# Patient Record
Sex: MALE | Race: WHITE | NOT HISPANIC OR LATINO | Employment: FULL TIME | URBAN - METROPOLITAN AREA
[De-identification: names, ages, dates, MRNs, and addresses within clinical notes are randomized per-mention and may not be internally consistent; named-entity substitution may affect disease eponyms.]

---

## 2023-02-16 PROBLEM — M54.50 LUMBAR PAIN: Status: ACTIVE | Noted: 2023-02-16

## 2023-02-16 PROBLEM — M47.812 CERVICAL ARTHRITIS: Status: ACTIVE | Noted: 2023-02-16

## 2023-02-16 PROBLEM — J32.9 SINUSITIS: Status: ACTIVE | Noted: 2023-02-16

## 2023-02-16 PROBLEM — J02.8 ACUTE BACTERIAL PHARYNGITIS: Status: ACTIVE | Noted: 2023-02-16

## 2023-02-16 PROBLEM — M79.89 FOOT SWELLING: Status: ACTIVE | Noted: 2023-02-16

## 2023-02-16 PROBLEM — G60.3 IDIOPATHIC PROGRESSIVE POLYNEUROPATHY: Status: ACTIVE | Noted: 2023-02-16

## 2023-02-16 PROBLEM — R07.9 CHEST PAIN: Status: ACTIVE | Noted: 2023-02-16

## 2023-02-16 PROBLEM — R07.81 RIB PAIN ON LEFT SIDE: Status: ACTIVE | Noted: 2023-02-16

## 2023-02-16 PROBLEM — M25.511 RIGHT SHOULDER PAIN: Status: ACTIVE | Noted: 2023-02-16

## 2023-02-16 PROBLEM — S46.819A TRAPEZIUS MUSCLE STRAIN: Status: ACTIVE | Noted: 2023-02-16

## 2023-02-16 PROBLEM — M72.2 PLANTAR FASCIITIS, RIGHT: Status: ACTIVE | Noted: 2023-02-16

## 2023-02-16 PROBLEM — M89.8X6 PAIN OF RIGHT TIBIA: Status: ACTIVE | Noted: 2023-02-16

## 2023-02-16 PROBLEM — R10.9 FLANK PAIN, ACUTE: Status: ACTIVE | Noted: 2023-02-16

## 2023-02-16 PROBLEM — Z86.69 HISTORY OF PERIPHERAL NEUROPATHY: Status: ACTIVE | Noted: 2023-02-16

## 2023-02-16 PROBLEM — K46.9 ABDOMINAL HERNIA: Status: ACTIVE | Noted: 2023-02-16

## 2023-02-16 PROBLEM — E66.9 OBESITY: Status: ACTIVE | Noted: 2023-02-16

## 2023-02-16 PROBLEM — J02.9 SORE THROAT: Status: ACTIVE | Noted: 2023-02-16

## 2023-02-16 PROBLEM — M10.9 ACUTE GOUT: Status: ACTIVE | Noted: 2023-02-16

## 2023-02-16 PROBLEM — M17.11 PRIMARY OSTEOARTHRITIS OF RIGHT KNEE: Status: ACTIVE | Noted: 2023-02-16

## 2023-02-16 PROBLEM — M79.671 RIGHT FOOT PAIN: Status: ACTIVE | Noted: 2023-02-16

## 2023-02-16 PROBLEM — J20.9 ACUTE BRONCHITIS: Status: ACTIVE | Noted: 2023-02-16

## 2023-02-16 PROBLEM — R68.89 HEAT INTOLERANCE: Status: ACTIVE | Noted: 2023-02-16

## 2023-02-16 PROBLEM — K57.30 SIGMOID DIVERTICULOSIS: Status: ACTIVE | Noted: 2023-02-16

## 2023-02-16 PROBLEM — R74.8 ABNORMAL LIVER ENZYMES: Status: ACTIVE | Noted: 2023-02-16

## 2023-02-16 PROBLEM — D22.9 ATYPICAL MOLE: Status: ACTIVE | Noted: 2023-02-16

## 2023-02-16 PROBLEM — B96.89 ACUTE BACTERIAL PHARYNGITIS: Status: ACTIVE | Noted: 2023-02-16

## 2023-02-16 PROBLEM — K21.9 GASTROESOPHAGEAL REFLUX DISEASE: Status: ACTIVE | Noted: 2023-02-16

## 2023-02-16 PROBLEM — R94.31 ABNORMAL EKG: Status: ACTIVE | Noted: 2023-02-16

## 2023-02-16 PROBLEM — N45.1 EPIDIDYMITIS: Status: ACTIVE | Noted: 2023-02-16

## 2023-02-16 PROBLEM — E11.9 TYPE 2 DIABETES MELLITUS (MULTI): Status: ACTIVE | Noted: 2023-02-16

## 2023-02-16 PROBLEM — I10 HYPERTENSION: Status: ACTIVE | Noted: 2023-02-16

## 2023-02-16 PROBLEM — L03.032 CELLULITIS OF SECOND TOE OF LEFT FOOT: Status: ACTIVE | Noted: 2023-02-16

## 2023-02-16 RX ORDER — BLOOD SUGAR DIAGNOSTIC
STRIP MISCELLANEOUS
COMMUNITY
Start: 2020-08-10

## 2023-02-16 RX ORDER — LISINOPRIL 20 MG/1
1 TABLET ORAL DAILY
COMMUNITY
Start: 2015-01-14 | End: 2023-06-26

## 2023-02-16 RX ORDER — LANCETS
EACH MISCELLANEOUS
COMMUNITY

## 2023-02-16 RX ORDER — ATORVASTATIN CALCIUM 20 MG/1
1 TABLET, FILM COATED ORAL DAILY
COMMUNITY
Start: 2021-06-23 | End: 2023-08-15

## 2023-02-16 RX ORDER — METFORMIN HYDROCHLORIDE 500 MG/1
1 TABLET ORAL
COMMUNITY
Start: 2020-05-18 | End: 2023-03-30

## 2023-02-16 RX ORDER — ASPIRIN 81 MG/1
1 TABLET ORAL DAILY
COMMUNITY
Start: 2020-05-18

## 2023-03-30 ENCOUNTER — OFFICE VISIT (OUTPATIENT)
Dept: PRIMARY CARE | Facility: CLINIC | Age: 63
End: 2023-03-30
Payer: COMMERCIAL

## 2023-03-30 VITALS
SYSTOLIC BLOOD PRESSURE: 141 MMHG | BODY MASS INDEX: 36.02 KG/M2 | HEIGHT: 76 IN | DIASTOLIC BLOOD PRESSURE: 81 MMHG | HEART RATE: 74 BPM | RESPIRATION RATE: 18 BRPM | WEIGHT: 295.8 LBS

## 2023-03-30 DIAGNOSIS — E11.9 TYPE 2 DIABETES MELLITUS WITHOUT COMPLICATION, WITHOUT LONG-TERM CURRENT USE OF INSULIN (MULTI): Primary | ICD-10-CM

## 2023-03-30 DIAGNOSIS — Z12.5 SCREENING FOR PROSTATE CANCER: ICD-10-CM

## 2023-03-30 DIAGNOSIS — K76.0 NONALCOHOLIC HEPATOSTEATOSIS: ICD-10-CM

## 2023-03-30 DIAGNOSIS — R74.8 ELEVATED LIVER ENZYMES: ICD-10-CM

## 2023-03-30 DIAGNOSIS — Z23 ENCOUNTER FOR IMMUNIZATION: ICD-10-CM

## 2023-03-30 PROCEDURE — 1036F TOBACCO NON-USER: CPT | Performed by: NURSE PRACTITIONER

## 2023-03-30 PROCEDURE — 90750 HZV VACC RECOMBINANT IM: CPT | Performed by: NURSE PRACTITIONER

## 2023-03-30 PROCEDURE — 4010F ACE/ARB THERAPY RXD/TAKEN: CPT | Performed by: NURSE PRACTITIONER

## 2023-03-30 PROCEDURE — 3079F DIAST BP 80-89 MM HG: CPT | Performed by: NURSE PRACTITIONER

## 2023-03-30 PROCEDURE — 3077F SYST BP >= 140 MM HG: CPT | Performed by: NURSE PRACTITIONER

## 2023-03-30 PROCEDURE — 99214 OFFICE O/P EST MOD 30 MIN: CPT | Performed by: NURSE PRACTITIONER

## 2023-03-30 PROCEDURE — 90471 IMMUNIZATION ADMIN: CPT | Performed by: NURSE PRACTITIONER

## 2023-03-30 RX ORDER — METFORMIN HYDROCHLORIDE 500 MG/1
TABLET ORAL
Qty: 180 TABLET | Refills: 1 | Status: SHIPPED | OUTPATIENT
Start: 2023-03-30 | End: 2023-10-05

## 2023-03-30 ASSESSMENT — ENCOUNTER SYMPTOMS
HEADACHES: 0
ABDOMINAL DISTENTION: 0
BRUISES/BLEEDS EASILY: 0
CHEST TIGHTNESS: 0
GASTROINTESTINAL NEGATIVE: 1
FATIGUE: 0
DIZZINESS: 0
CONSTIPATION: 0
PALPITATIONS: 0
FEVER: 0
APPETITE CHANGE: 0
EYES NEGATIVE: 1
POLYDIPSIA: 0
SHORTNESS OF BREATH: 0
ACTIVITY CHANGE: 0
RESPIRATORY NEGATIVE: 1
ABDOMINAL PAIN: 0
POLYPHAGIA: 0
ENDOCRINE NEGATIVE: 1
WEAKNESS: 0
VOMITING: 0
BLOOD IN STOOL: 0
CHILLS: 0
CARDIOVASCULAR NEGATIVE: 1
DIAPHORESIS: 0
ADENOPATHY: 0
NAUSEA: 0
DIARRHEA: 0
WHEEZING: 0

## 2023-03-30 NOTE — PROGRESS NOTES
"Subjective   Patient ID: Eric Tinajero is a 63 y.o. male who presents for Follow-up (Blood pressure, second shingrix shot).    HPI   Patient in office for follow-up on hypertension, hyperlipidemia, and diabetes. He needs a blood pressure check, A1C recheck (would like to be added to regular fasting blood work), and medication refill. Imaging at recent ER visit for abdominal pain showed Steatosis of liver (the patient has no symptoms); will refer to gastroenterology for a consult. The patient would like to receive his second Shingrix shot today. No other concerns today.    BP: controlled    Sees ophthalmologist regularly.     Monitors his feet for lesions.    Review of Systems   Constitutional:  Negative for activity change, appetite change, chills, diaphoresis, fatigue and fever.   HENT: Negative.     Eyes: Negative.    Respiratory: Negative.  Negative for chest tightness, shortness of breath and wheezing.    Cardiovascular: Negative.  Negative for chest pain, palpitations and leg swelling.   Gastrointestinal: Negative.  Negative for abdominal distention, abdominal pain, blood in stool, constipation, diarrhea, nausea and vomiting.   Endocrine: Negative.  Negative for cold intolerance, heat intolerance, polydipsia, polyphagia and polyuria.   Genitourinary: Negative.    Neurological:  Negative for dizziness, syncope, weakness and headaches.   Hematological:  Negative for adenopathy. Does not bruise/bleed easily.       Objective   /81   Pulse 74   Resp 18   Ht 1.93 m (6' 4\")   Wt 134 kg (295 lb 12.8 oz)   BMI 36.01 kg/m²     Physical Exam  Constitutional:       Appearance: Normal appearance.   HENT:      Head: Normocephalic.   Eyes:      Conjunctiva/sclera: Conjunctivae normal.   Cardiovascular:      Rate and Rhythm: Normal rate and regular rhythm.      Pulses: Normal pulses.      Heart sounds: Normal heart sounds.   Pulmonary:      Effort: Pulmonary effort is normal.      Breath sounds: Normal breath " sounds.   Neurological:      General: No focal deficit present.      Mental Status: He is alert.      Gait: Gait normal.       Assessment/Plan     Exam findings reviewed with patient. Medications, referral, and and lab work orders reviewed. Patient will keep monitoring blood pressures and blood sugars at home; he will call the office with outliers or abnormal trends. We will call with lab results and update the patient on the plan of care. Follow up in 6 months for A1C recheck, fasting lipids, physical, or earlier as needed.      Benefits of healthy lifestyle reviewed: low carbohydrates/fat/sugar diet; use lean white instead of red meet; use several servings of fruit and vegetables daily; use whole grain flour instead of white flour products; cook at home frequently instead of eating out; exercise 30-90 minutes 5 x/week.

## 2023-03-31 ENCOUNTER — LAB (OUTPATIENT)
Dept: LAB | Facility: LAB | Age: 63
End: 2023-03-31
Payer: COMMERCIAL

## 2023-03-31 DIAGNOSIS — E11.9 TYPE 2 DIABETES MELLITUS WITHOUT COMPLICATION, WITHOUT LONG-TERM CURRENT USE OF INSULIN (MULTI): ICD-10-CM

## 2023-03-31 DIAGNOSIS — Z12.5 SCREENING FOR PROSTATE CANCER: ICD-10-CM

## 2023-03-31 LAB
ALANINE AMINOTRANSFERASE (SGPT) (U/L) IN SER/PLAS: 69 U/L (ref 10–52)
ALBUMIN (G/DL) IN SER/PLAS: 4.6 G/DL (ref 3.4–5)
ALKALINE PHOSPHATASE (U/L) IN SER/PLAS: 50 U/L (ref 33–136)
ANION GAP IN SER/PLAS: 15 MMOL/L (ref 10–20)
ASPARTATE AMINOTRANSFERASE (SGOT) (U/L) IN SER/PLAS: 45 U/L (ref 9–39)
BASOPHILS (10*3/UL) IN BLOOD BY AUTOMATED COUNT: 0.08 X10E9/L (ref 0–0.1)
BASOPHILS/100 LEUKOCYTES IN BLOOD BY AUTOMATED COUNT: 0.8 % (ref 0–2)
BILIRUBIN TOTAL (MG/DL) IN SER/PLAS: 1.6 MG/DL (ref 0–1.2)
CALCIUM (MG/DL) IN SER/PLAS: 9.5 MG/DL (ref 8.6–10.6)
CARBON DIOXIDE, TOTAL (MMOL/L) IN SER/PLAS: 27 MMOL/L (ref 21–32)
CHLORIDE (MMOL/L) IN SER/PLAS: 101 MMOL/L (ref 98–107)
CREATININE (MG/DL) IN SER/PLAS: 0.82 MG/DL (ref 0.5–1.3)
EOSINOPHILS (10*3/UL) IN BLOOD BY AUTOMATED COUNT: 0.19 X10E9/L (ref 0–0.7)
EOSINOPHILS/100 LEUKOCYTES IN BLOOD BY AUTOMATED COUNT: 1.8 % (ref 0–6)
ERYTHROCYTE DISTRIBUTION WIDTH (RATIO) BY AUTOMATED COUNT: 12.3 % (ref 11.5–14.5)
ERYTHROCYTE MEAN CORPUSCULAR HEMOGLOBIN CONCENTRATION (G/DL) BY AUTOMATED: 34.2 G/DL (ref 32–36)
ERYTHROCYTE MEAN CORPUSCULAR VOLUME (FL) BY AUTOMATED COUNT: 89 FL (ref 80–100)
ERYTHROCYTES (10*6/UL) IN BLOOD BY AUTOMATED COUNT: 5.77 X10E12/L (ref 4.5–5.9)
ESTIMATED AVERAGE GLUCOSE FOR HBA1C: 137 MG/DL
GFR MALE: >90 ML/MIN/1.73M2
GLUCOSE (MG/DL) IN SER/PLAS: 143 MG/DL (ref 74–99)
HEMATOCRIT (%) IN BLOOD BY AUTOMATED COUNT: 51.2 % (ref 41–52)
HEMOGLOBIN (G/DL) IN BLOOD: 17.5 G/DL (ref 13.5–17.5)
HEMOGLOBIN A1C/HEMOGLOBIN TOTAL IN BLOOD: 6.4 %
IMMATURE GRANULOCYTES/100 LEUKOCYTES IN BLOOD BY AUTOMATED COUNT: 0.5 % (ref 0–0.9)
LEUKOCYTES (10*3/UL) IN BLOOD BY AUTOMATED COUNT: 10.3 X10E9/L (ref 4.4–11.3)
LYMPHOCYTES (10*3/UL) IN BLOOD BY AUTOMATED COUNT: 1.82 X10E9/L (ref 1.2–4.8)
LYMPHOCYTES/100 LEUKOCYTES IN BLOOD BY AUTOMATED COUNT: 17.7 % (ref 13–44)
MONOCYTES (10*3/UL) IN BLOOD BY AUTOMATED COUNT: 1.83 X10E9/L (ref 0.1–1)
MONOCYTES/100 LEUKOCYTES IN BLOOD BY AUTOMATED COUNT: 17.7 % (ref 2–10)
NEUTROPHILS (10*3/UL) IN BLOOD BY AUTOMATED COUNT: 6.34 X10E9/L (ref 1.2–7.7)
NEUTROPHILS/100 LEUKOCYTES IN BLOOD BY AUTOMATED COUNT: 61.5 % (ref 40–80)
NRBC (PER 100 WBCS) BY AUTOMATED COUNT: 0 /100 WBC (ref 0–0)
PLATELETS (10*3/UL) IN BLOOD AUTOMATED COUNT: 191 X10E9/L (ref 150–450)
POTASSIUM (MMOL/L) IN SER/PLAS: 4.5 MMOL/L (ref 3.5–5.3)
PROSTATE SPECIFIC ANTIGEN,SCREEN: 0.66 NG/ML (ref 0–4)
PROTEIN TOTAL: 7 G/DL (ref 6.4–8.2)
SODIUM (MMOL/L) IN SER/PLAS: 138 MMOL/L (ref 136–145)
THYROTROPIN (MIU/L) IN SER/PLAS BY DETECTION LIMIT <= 0.05 MIU/L: 1.96 MIU/L (ref 0.44–3.98)
UREA NITROGEN (MG/DL) IN SER/PLAS: 13 MG/DL (ref 6–23)

## 2023-03-31 PROCEDURE — 83036 HEMOGLOBIN GLYCOSYLATED A1C: CPT

## 2023-03-31 PROCEDURE — 36415 COLL VENOUS BLD VENIPUNCTURE: CPT

## 2023-03-31 PROCEDURE — 80053 COMPREHEN METABOLIC PANEL: CPT

## 2023-03-31 PROCEDURE — 84153 ASSAY OF PSA TOTAL: CPT

## 2023-03-31 PROCEDURE — 85025 COMPLETE CBC W/AUTO DIFF WBC: CPT

## 2023-03-31 PROCEDURE — 84443 ASSAY THYROID STIM HORMONE: CPT

## 2023-03-31 NOTE — RESULT ENCOUNTER NOTE
Please call the patient. (1) A1C is stable; 6.4%; advocate healthy diet and exercise; recheck in 6 months. (2) Normal PSA; recheck in 1 year; (3) slightly elevated liver enzymes; watch fat and alcohol intake; stay well-hydrated; advise patient to review enzyme levels with gastroenterologist; per referral; (4) elevated monocytes (non-specific marker for inflammation, infection, or auto-immune process); advise the patient to review monocyte elevation with gastroenterologist; if there is no correlation with gastrointestinal problem/s, consider referral to hematology. Follow up in 1 month for reevaluation or earlier as needed. TY

## 2023-07-24 ENCOUNTER — APPOINTMENT (OUTPATIENT)
Dept: LAB | Facility: LAB | Age: 63
End: 2023-07-24
Payer: COMMERCIAL

## 2023-07-24 LAB
ALANINE AMINOTRANSFERASE (SGPT) (U/L) IN SER/PLAS: 88 U/L (ref 10–52)
ALBUMIN (G/DL) IN SER/PLAS: 4.8 G/DL (ref 3.4–5)
ALKALINE PHOSPHATASE (U/L) IN SER/PLAS: 45 U/L (ref 33–136)
ALPHA 1 ANTITRYPSIN (MG/DL) IN SER/PLAS: 144 MG/DL (ref 84–218)
ALPHA-1 FETOPROTEIN (NG/ML) IN SER/PLAS: 5 NG/ML (ref 0–9)
ASPARTATE AMINOTRANSFERASE (SGOT) (U/L) IN SER/PLAS: 45 U/L (ref 9–39)
BILIRUBIN DIRECT (MG/DL) IN SER/PLAS: 0.2 MG/DL (ref 0–0.3)
BILIRUBIN TOTAL (MG/DL) IN SER/PLAS: 0.8 MG/DL (ref 0–1.2)
CERULOPLASMIN (MG/DL) IN SER/PLAS: 22 MG/DL (ref 20–60)
FERRITIN (UG/LL) IN SER/PLAS: 626 UG/L (ref 20–300)
HEPATITIS A TOTAL AB INTERPRETATION: REACTIVE
HEPATITIS B VIRUS CORE AB (PRESENCE) IN SER/PLAS BY IMM: NONREACTIVE
HEPATITIS B VIRUS SURFACE AB (MIU/ML) IN SERUM: 10.3 MIU/ML
HEPATITIS B VIRUS SURFACE AG PRESENCE IN SERUM: NONREACTIVE
HEPATITIS C VIRUS AB PRESENCE IN SERUM: NONREACTIVE
INR IN PPP BY COAGULATION ASSAY: 1.1 (ref 0.9–1.1)
IRON (UG/DL) IN SER/PLAS: 140 UG/DL (ref 35–150)
IRON BINDING CAPACITY (UG/DL) IN SER/PLAS: 371 UG/DL (ref 240–445)
IRON SATURATION (%) IN SER/PLAS: 38 % (ref 25–45)
PROTEIN TOTAL: 7.1 G/DL (ref 6.4–8.2)
PROTHROMBIN TIME (PT) IN PPP BY COAGULATION ASSAY: 12.4 SEC (ref 9.8–12.8)

## 2023-07-25 LAB
ANTI-NUCLEAR ANTIBODY (ANA): NEGATIVE
ANTI-SMOOTH MUSCLE ANTIBODY: NEGATIVE
MITOCHONDRIAL ANTIBODY: NEGATIVE

## 2023-07-28 LAB
EER PETH: NORMAL
PETH 16:0/18:1 (POPETH): <10 NG/ML
PETH 16:0/18:2 (PLPETH): <10 NG/ML

## 2023-10-05 DIAGNOSIS — E11.9 TYPE 2 DIABETES MELLITUS WITHOUT COMPLICATION, WITHOUT LONG-TERM CURRENT USE OF INSULIN (MULTI): ICD-10-CM

## 2023-10-05 RX ORDER — METFORMIN HYDROCHLORIDE 500 MG/1
500 TABLET ORAL
Qty: 60 TABLET | Refills: 0 | Status: SHIPPED | OUTPATIENT
Start: 2023-10-05 | End: 2023-11-03

## 2023-11-03 DIAGNOSIS — E11.9 TYPE 2 DIABETES MELLITUS WITHOUT COMPLICATION, WITHOUT LONG-TERM CURRENT USE OF INSULIN (MULTI): ICD-10-CM

## 2023-11-03 RX ORDER — METFORMIN HYDROCHLORIDE 500 MG/1
500 TABLET ORAL
Qty: 180 TABLET | Refills: 1 | Status: SHIPPED | OUTPATIENT
Start: 2023-11-03 | End: 2024-05-13

## 2024-01-10 ASSESSMENT — ENCOUNTER SYMPTOMS
BLURRED VISION: 0
SWEATS: 0
HYPERTENSION: 1
SHORTNESS OF BREATH: 0
ORTHOPNEA: 0
PALPITATIONS: 0
NECK PAIN: 0
HEADACHES: 0
PND: 0

## 2024-01-11 ENCOUNTER — OFFICE VISIT (OUTPATIENT)
Dept: PRIMARY CARE | Facility: CLINIC | Age: 64
End: 2024-01-11
Payer: COMMERCIAL

## 2024-01-11 VITALS
BODY MASS INDEX: 35.18 KG/M2 | DIASTOLIC BLOOD PRESSURE: 82 MMHG | SYSTOLIC BLOOD PRESSURE: 138 MMHG | WEIGHT: 289 LBS | HEART RATE: 71 BPM | OXYGEN SATURATION: 96 % | TEMPERATURE: 97.6 F | RESPIRATION RATE: 18 BRPM

## 2024-01-11 DIAGNOSIS — E11.9 TYPE 2 DIABETES MELLITUS WITHOUT COMPLICATION, WITHOUT LONG-TERM CURRENT USE OF INSULIN (MULTI): ICD-10-CM

## 2024-01-11 DIAGNOSIS — Z91.89 OTHER SPECIFIED PERSONAL RISK FACTORS, NOT ELSEWHERE CLASSIFIED: ICD-10-CM

## 2024-01-11 DIAGNOSIS — I10 ESSENTIAL (PRIMARY) HYPERTENSION: Primary | ICD-10-CM

## 2024-01-11 PROCEDURE — 80061 LIPID PANEL: CPT

## 2024-01-11 PROCEDURE — 80053 COMPREHEN METABOLIC PANEL: CPT

## 2024-01-11 PROCEDURE — 1036F TOBACCO NON-USER: CPT | Performed by: NURSE PRACTITIONER

## 2024-01-11 PROCEDURE — 85025 COMPLETE CBC W/AUTO DIFF WBC: CPT

## 2024-01-11 PROCEDURE — 4010F ACE/ARB THERAPY RXD/TAKEN: CPT | Performed by: NURSE PRACTITIONER

## 2024-01-11 PROCEDURE — 3079F DIAST BP 80-89 MM HG: CPT | Performed by: NURSE PRACTITIONER

## 2024-01-11 PROCEDURE — 3075F SYST BP GE 130 - 139MM HG: CPT | Performed by: NURSE PRACTITIONER

## 2024-01-11 PROCEDURE — 99213 OFFICE O/P EST LOW 20 MIN: CPT | Performed by: NURSE PRACTITIONER

## 2024-01-11 PROCEDURE — 83036 HEMOGLOBIN GLYCOSYLATED A1C: CPT

## 2024-01-11 PROCEDURE — 36415 COLL VENOUS BLD VENIPUNCTURE: CPT

## 2024-01-11 RX ORDER — LISINOPRIL 20 MG/1
20 TABLET ORAL DAILY
Qty: 90 TABLET | Refills: 0 | Status: CANCELLED | OUTPATIENT
Start: 2024-01-11

## 2024-01-11 RX ORDER — LISINOPRIL 20 MG/1
20 TABLET ORAL DAILY
Qty: 90 TABLET | Refills: 1 | Status: SHIPPED | OUTPATIENT
Start: 2024-01-11 | End: 2024-05-31 | Stop reason: SDUPTHER

## 2024-01-11 RX ORDER — ATORVASTATIN CALCIUM 20 MG/1
20 TABLET, FILM COATED ORAL DAILY
Qty: 90 TABLET | Refills: 1 | Status: SHIPPED | OUTPATIENT
Start: 2024-01-11 | End: 2024-05-31 | Stop reason: SDUPTHER

## 2024-01-11 RX ORDER — PYRIDOXINE HCL (VITAMIN B6) 100 MG
TABLET ORAL
COMMUNITY
Start: 2023-07-17

## 2024-01-11 RX ORDER — CALCIUM CARBONATE 300MG(750)
TABLET,CHEWABLE ORAL
COMMUNITY
Start: 2023-07-17

## 2024-01-11 RX ORDER — GLUCOSAMINE/CHONDROITIN/C/MANG 500-400 MG
CAPSULE ORAL
COMMUNITY

## 2024-01-11 ASSESSMENT — ENCOUNTER SYMPTOMS
FATIGUE: 0
ADENOPATHY: 0
ACTIVITY CHANGE: 0
BLOOD IN STOOL: 0
ABDOMINAL PAIN: 0
DIARRHEA: 0
PALPITATIONS: 0
BRUISES/BLEEDS EASILY: 0
SHORTNESS OF BREATH: 0
DIAPHORESIS: 0
WHEEZING: 0
DIZZINESS: 0
ABDOMINAL DISTENTION: 0
ENDOCRINE NEGATIVE: 1
HEADACHES: 0
FEVER: 0
GASTROINTESTINAL NEGATIVE: 1
POLYPHAGIA: 0
VOMITING: 0
CONSTIPATION: 0
POLYDIPSIA: 0
APPETITE CHANGE: 0
CARDIOVASCULAR NEGATIVE: 1
NAUSEA: 0
RESPIRATORY NEGATIVE: 1
WEAKNESS: 0
CHILLS: 0
CHEST TIGHTNESS: 0
EYES NEGATIVE: 1

## 2024-01-11 NOTE — PROGRESS NOTES
Subjective   Patient ID: Eric Tinajero is a 63 y.o. male who presents for Med Refill.    HPI   Patient in office for follow-up on hypertension, hyperlipidemia, and diabetes. He needs a blood pressure check, A1C recheck, and medication refill. He was seen by gastroenterology for elevated liver enzymes but released from their care with advise to watch his diet closely. No other concerns today.    BP: controlled    Sees ophthalmologist regularly.     Monitors his feet for lesions.    Review of Systems   Constitutional:  Negative for activity change, appetite change, chills, diaphoresis, fatigue and fever.   HENT: Negative.     Eyes: Negative.    Respiratory: Negative.  Negative for chest tightness, shortness of breath and wheezing.    Cardiovascular: Negative.  Negative for chest pain, palpitations and leg swelling.   Gastrointestinal: Negative.  Negative for abdominal distention, abdominal pain, blood in stool, constipation, diarrhea, nausea and vomiting.   Endocrine: Negative.  Negative for cold intolerance, heat intolerance, polydipsia, polyphagia and polyuria.   Genitourinary: Negative.    Neurological:  Negative for dizziness, syncope, weakness and headaches.   Hematological:  Negative for adenopathy. Does not bruise/bleed easily.       Objective   /82   Pulse 71   Temp 36.4 °C (97.6 °F)   Resp 18   Wt 131 kg (289 lb)   SpO2 96%   BMI 35.18 kg/m²     Physical Exam  Constitutional:       Appearance: Normal appearance.   HENT:      Head: Normocephalic.   Eyes:      Conjunctiva/sclera: Conjunctivae normal.   Cardiovascular:      Rate and Rhythm: Normal rate and regular rhythm.      Pulses: Normal pulses.      Heart sounds: Normal heart sounds.   Pulmonary:      Effort: Pulmonary effort is normal.      Breath sounds: Normal breath sounds.   Neurological:      General: No focal deficit present.      Mental Status: He is alert.      Gait: Gait normal.       Assessment/Plan     Exam findings reviewed with  patient. Medications, referral, and and lab work orders reviewed. Patient will keep monitoring blood pressures and blood sugars at home; he will call the office with outliers or abnormal trends. We will call with lab results and update the patient on the plan of care. Follow up in 6 months for A1C recheck, fasting lipids, physical, or earlier as needed.      Benefits of healthy lifestyle reviewed: low carbohydrates/fat/sugar diet; use lean white instead of red meet; use several servings of fruit and vegetables daily; use whole grain flour instead of white flour products; cook at home frequently instead of eating out; exercise 30-90 minutes 5 x/week.

## 2024-01-11 NOTE — PROGRESS NOTES
Answers submitted by the patient for this visit:  High Blood Pressure Questionnaire (Submitted on 1/10/2024)  Chief Complaint: Hypertension  Chronicity: chronic  Onset: more than 1 year ago  Progression since onset: waxing and waning  Condition status: controlled  anxiety: No  blurred vision: No  chest pain: No  headaches: No  malaise/fatigue: No  neck pain: No  orthopnea: No  palpitations: No  peripheral edema: No  PND: No  shortness of breath: No  sweats: No  Agents associated with hypertension: no associated agents  CAD risks: diabetes mellitus, family history, obesity, stress  Compliance problems: exercise

## 2024-01-12 DIAGNOSIS — D72.821 MONOCYTOSIS: Primary | ICD-10-CM

## 2024-01-12 LAB
ALBUMIN SERPL BCP-MCNC: 4.5 G/DL (ref 3.4–5)
ALP SERPL-CCNC: 39 U/L (ref 33–136)
ALT SERPL W P-5'-P-CCNC: 71 U/L (ref 10–52)
ANION GAP SERPL CALC-SCNC: 15 MMOL/L (ref 10–20)
AST SERPL W P-5'-P-CCNC: 49 U/L (ref 9–39)
BASOPHILS # BLD AUTO: 0.07 X10*3/UL (ref 0–0.1)
BASOPHILS NFR BLD AUTO: 1 %
BILIRUB SERPL-MCNC: 0.7 MG/DL (ref 0–1.2)
BUN SERPL-MCNC: 13 MG/DL (ref 6–23)
CALCIUM SERPL-MCNC: 9.8 MG/DL (ref 8.6–10.6)
CHLORIDE SERPL-SCNC: 103 MMOL/L (ref 98–107)
CHOLEST SERPL-MCNC: 92 MG/DL (ref 0–199)
CHOLESTEROL/HDL RATIO: 2.2
CO2 SERPL-SCNC: 28 MMOL/L (ref 21–32)
CREAT SERPL-MCNC: 0.92 MG/DL (ref 0.5–1.3)
EGFRCR SERPLBLD CKD-EPI 2021: >90 ML/MIN/1.73M*2
EOSINOPHIL # BLD AUTO: 0.16 X10*3/UL (ref 0–0.7)
EOSINOPHIL NFR BLD AUTO: 2.3 %
ERYTHROCYTE [DISTWIDTH] IN BLOOD BY AUTOMATED COUNT: 12.4 % (ref 11.5–14.5)
EST. AVERAGE GLUCOSE BLD GHB EST-MCNC: 148 MG/DL
GLUCOSE SERPL-MCNC: 157 MG/DL (ref 74–99)
HBA1C MFR BLD: 6.8 %
HCT VFR BLD AUTO: 49.8 % (ref 41–52)
HDLC SERPL-MCNC: 41.1 MG/DL
HGB BLD-MCNC: 16.5 G/DL (ref 13.5–17.5)
IMM GRANULOCYTES # BLD AUTO: 0.02 X10*3/UL (ref 0–0.7)
IMM GRANULOCYTES NFR BLD AUTO: 0.3 % (ref 0–0.9)
LDLC SERPL CALC-MCNC: 32 MG/DL
LYMPHOCYTES # BLD AUTO: 1.92 X10*3/UL (ref 1.2–4.8)
LYMPHOCYTES NFR BLD AUTO: 27.5 %
MCH RBC QN AUTO: 30.6 PG (ref 26–34)
MCHC RBC AUTO-ENTMCNC: 33.1 G/DL (ref 32–36)
MCV RBC AUTO: 92 FL (ref 80–100)
MONOCYTES # BLD AUTO: 1.3 X10*3/UL (ref 0.1–1)
MONOCYTES NFR BLD AUTO: 18.6 %
NEUTROPHILS # BLD AUTO: 3.52 X10*3/UL (ref 1.2–7.7)
NEUTROPHILS NFR BLD AUTO: 50.3 %
NON HDL CHOLESTEROL: 51 MG/DL (ref 0–149)
NRBC BLD-RTO: 0 /100 WBCS (ref 0–0)
PLATELET # BLD AUTO: 183 X10*3/UL (ref 150–450)
POTASSIUM SERPL-SCNC: 4.7 MMOL/L (ref 3.5–5.3)
PROT SERPL-MCNC: 7.1 G/DL (ref 6.4–8.2)
RBC # BLD AUTO: 5.4 X10*6/UL (ref 4.5–5.9)
SODIUM SERPL-SCNC: 141 MMOL/L (ref 136–145)
TRIGL SERPL-MCNC: 95 MG/DL (ref 0–149)
VLDL: 19 MG/DL (ref 0–40)
WBC # BLD AUTO: 7 X10*3/UL (ref 4.4–11.3)

## 2024-01-12 NOTE — RESULT ENCOUNTER NOTE
Please call the patient. Overall stable labs. A1C is 6.8%. Cholesterol is normal. Liver enzymes remain slightly elevated but stable. [facility] diet and exercise; limit fat, sugar, and alcohol intake. Monocytes (a white blood cell type) remains slightly elevated. Will refer to hematology for a consult. Recheck labs in 6 months. TY

## 2024-04-15 DIAGNOSIS — D72.821 MONOCYTOSIS: Primary | ICD-10-CM

## 2024-04-17 ENCOUNTER — LAB (OUTPATIENT)
Dept: LAB | Facility: LAB | Age: 64
End: 2024-04-17
Payer: COMMERCIAL

## 2024-04-17 DIAGNOSIS — D72.821 MONOCYTOSIS: ICD-10-CM

## 2024-04-17 LAB
ALBUMIN SERPL BCP-MCNC: 4.4 G/DL (ref 3.4–5)
ALP SERPL-CCNC: 45 U/L (ref 33–136)
ALT SERPL W P-5'-P-CCNC: 72 U/L (ref 10–52)
ANION GAP SERPL CALC-SCNC: 14 MMOL/L (ref 10–20)
AST SERPL W P-5'-P-CCNC: 37 U/L (ref 9–39)
BASOPHILS # BLD AUTO: 0.06 X10*3/UL (ref 0–0.1)
BASOPHILS NFR BLD AUTO: 0.8 %
BILIRUB SERPL-MCNC: 0.7 MG/DL (ref 0–1.2)
BUN SERPL-MCNC: 14 MG/DL (ref 6–23)
CALCIUM SERPL-MCNC: 9.7 MG/DL (ref 8.6–10.6)
CHLORIDE SERPL-SCNC: 102 MMOL/L (ref 98–107)
CO2 SERPL-SCNC: 29 MMOL/L (ref 21–32)
CREAT SERPL-MCNC: 0.89 MG/DL (ref 0.5–1.3)
CRP SERPL-MCNC: <0.1 MG/DL
EBV VCA IGG SER IA-ACNC: NEGATIVE
EBV VCA IGM SER IA-ACNC: NEGATIVE
EGFRCR SERPLBLD CKD-EPI 2021: >90 ML/MIN/1.73M*2
EOSINOPHIL # BLD AUTO: 0.14 X10*3/UL (ref 0–0.7)
EOSINOPHIL NFR BLD AUTO: 1.8 %
ERYTHROCYTE [DISTWIDTH] IN BLOOD BY AUTOMATED COUNT: 12.1 % (ref 11.5–14.5)
ERYTHROCYTE [SEDIMENTATION RATE] IN BLOOD BY WESTERGREN METHOD: 6 MM/H (ref 0–20)
GLUCOSE SERPL-MCNC: 125 MG/DL (ref 74–99)
HBV CORE IGM SER QL: NONREACTIVE
HBV SURFACE AG SERPL QL IA: NONREACTIVE
HCT VFR BLD AUTO: 49.7 % (ref 41–52)
HCV AB SER QL: NONREACTIVE
HGB BLD-MCNC: 16.9 G/DL (ref 13.5–17.5)
HIV 1+2 AB+HIV1 P24 AG SERPL QL IA: NONREACTIVE
IGA SERPL-MCNC: 169 MG/DL (ref 70–400)
IGG SERPL-MCNC: 952 MG/DL (ref 700–1600)
IGM SERPL-MCNC: 91 MG/DL (ref 40–230)
IMM GRANULOCYTES # BLD AUTO: 0.05 X10*3/UL (ref 0–0.7)
IMM GRANULOCYTES NFR BLD AUTO: 0.7 % (ref 0–0.9)
LYMPHOCYTES # BLD AUTO: 2.13 X10*3/UL (ref 1.2–4.8)
LYMPHOCYTES NFR BLD AUTO: 28.1 %
MCH RBC QN AUTO: 31.1 PG (ref 26–34)
MCHC RBC AUTO-ENTMCNC: 34 G/DL (ref 32–36)
MCV RBC AUTO: 91 FL (ref 80–100)
MONOCYTES # BLD AUTO: 1.73 X10*3/UL (ref 0.1–1)
MONOCYTES NFR BLD AUTO: 22.9 %
NEUTROPHILS # BLD AUTO: 3.46 X10*3/UL (ref 1.2–7.7)
NEUTROPHILS NFR BLD AUTO: 45.7 %
NRBC BLD-RTO: 0 /100 WBCS (ref 0–0)
PLATELET # BLD AUTO: 176 X10*3/UL (ref 150–450)
POTASSIUM SERPL-SCNC: 4.4 MMOL/L (ref 3.5–5.3)
PROT SERPL-MCNC: 7 G/DL (ref 6.4–8.2)
PROT SERPL-MCNC: 7 G/DL (ref 6.4–8.2)
RBC # BLD AUTO: 5.44 X10*6/UL (ref 4.5–5.9)
RHEUMATOID FACT SER NEPH-ACNC: <10 IU/ML (ref 0–15)
SODIUM SERPL-SCNC: 141 MMOL/L (ref 136–145)
WBC # BLD AUTO: 7.6 X10*3/UL (ref 4.4–11.3)

## 2024-04-17 PROCEDURE — 88184 FLOWCYTOMETRY/ TC 1 MARKER: CPT

## 2024-04-17 PROCEDURE — 86038 ANTINUCLEAR ANTIBODIES: CPT

## 2024-04-17 PROCEDURE — 86803 HEPATITIS C AB TEST: CPT

## 2024-04-17 PROCEDURE — 81450 HL NEO GSAP 5-50DNA/DNA&RNA: CPT

## 2024-04-17 PROCEDURE — 83521 IG LIGHT CHAINS FREE EACH: CPT

## 2024-04-17 PROCEDURE — 86431 RHEUMATOID FACTOR QUANT: CPT

## 2024-04-17 PROCEDURE — 88271 CYTOGENETICS DNA PROBE: CPT

## 2024-04-17 PROCEDURE — 88185 FLOWCYTOMETRY/TC ADD-ON: CPT

## 2024-04-17 PROCEDURE — 84165 PROTEIN E-PHORESIS SERUM: CPT

## 2024-04-17 PROCEDURE — 88291 CYTO/MOLECULAR REPORT: CPT | Performed by: PHYSICIAN ASSISTANT

## 2024-04-17 PROCEDURE — G0452 MOLECULAR PATHOLOGY INTERPR: HCPCS | Performed by: PHYSICIAN ASSISTANT

## 2024-04-17 PROCEDURE — 88275 CYTOGENETICS 100-300: CPT

## 2024-04-17 PROCEDURE — 86665 EPSTEIN-BARR CAPSID VCA: CPT

## 2024-04-17 PROCEDURE — 88189 FLOWCYTOMETRY/READ 16 & >: CPT | Performed by: PHYSICIAN ASSISTANT

## 2024-04-17 PROCEDURE — 85025 COMPLETE CBC W/AUTO DIFF WBC: CPT

## 2024-04-17 PROCEDURE — 87389 HIV-1 AG W/HIV-1&-2 AB AG IA: CPT

## 2024-04-17 PROCEDURE — 86140 C-REACTIVE PROTEIN: CPT

## 2024-04-17 PROCEDURE — 87340 HEPATITIS B SURFACE AG IA: CPT

## 2024-04-17 PROCEDURE — 86705 HEP B CORE ANTIBODY IGM: CPT

## 2024-04-17 PROCEDURE — 85652 RBC SED RATE AUTOMATED: CPT

## 2024-04-17 PROCEDURE — 36415 COLL VENOUS BLD VENIPUNCTURE: CPT

## 2024-04-17 PROCEDURE — 82784 ASSAY IGA/IGD/IGG/IGM EACH: CPT

## 2024-04-17 PROCEDURE — 80053 COMPREHEN METABOLIC PANEL: CPT

## 2024-04-17 PROCEDURE — 84165 PROTEIN E-PHORESIS SERUM: CPT | Performed by: PHYSICIAN ASSISTANT

## 2024-04-17 PROCEDURE — 84155 ASSAY OF PROTEIN SERUM: CPT

## 2024-04-18 ENCOUNTER — OFFICE VISIT (OUTPATIENT)
Dept: HEMATOLOGY/ONCOLOGY | Facility: HOSPITAL | Age: 64
End: 2024-04-18
Payer: COMMERCIAL

## 2024-04-18 VITALS
HEIGHT: 76 IN | TEMPERATURE: 96.1 F | SYSTOLIC BLOOD PRESSURE: 151 MMHG | DIASTOLIC BLOOD PRESSURE: 99 MMHG | OXYGEN SATURATION: 97 % | BODY MASS INDEX: 36 KG/M2 | RESPIRATION RATE: 20 BRPM | HEART RATE: 91 BPM | WEIGHT: 295.64 LBS

## 2024-04-18 DIAGNOSIS — D72.821 MONOCYTOSIS: ICD-10-CM

## 2024-04-18 LAB
ANA SER QL HEP2 SUBST: NEGATIVE
KAPPA LC SERPL-MCNC: 2.73 MG/DL (ref 0.33–1.94)
KAPPA LC/LAMBDA SER: 1.23 {RATIO} (ref 0.26–1.65)
LAMBDA LC SERPL-MCNC: 2.22 MG/DL (ref 0.57–2.63)

## 2024-04-18 PROCEDURE — 4010F ACE/ARB THERAPY RXD/TAKEN: CPT | Performed by: PHYSICIAN ASSISTANT

## 2024-04-18 PROCEDURE — 1036F TOBACCO NON-USER: CPT | Performed by: PHYSICIAN ASSISTANT

## 2024-04-18 PROCEDURE — 3077F SYST BP >= 140 MM HG: CPT | Performed by: PHYSICIAN ASSISTANT

## 2024-04-18 PROCEDURE — 3048F LDL-C <100 MG/DL: CPT | Performed by: PHYSICIAN ASSISTANT

## 2024-04-18 PROCEDURE — 3044F HG A1C LEVEL LT 7.0%: CPT | Performed by: PHYSICIAN ASSISTANT

## 2024-04-18 PROCEDURE — 99214 OFFICE O/P EST MOD 30 MIN: CPT | Performed by: PHYSICIAN ASSISTANT

## 2024-04-18 PROCEDURE — 99204 OFFICE O/P NEW MOD 45 MIN: CPT | Performed by: PHYSICIAN ASSISTANT

## 2024-04-18 PROCEDURE — 3080F DIAST BP >= 90 MM HG: CPT | Performed by: PHYSICIAN ASSISTANT

## 2024-04-18 ASSESSMENT — PATIENT HEALTH QUESTIONNAIRE - PHQ9
2. FEELING DOWN, DEPRESSED OR HOPELESS: NOT AT ALL
SUM OF ALL RESPONSES TO PHQ9 QUESTIONS 1 AND 2: 0
1. LITTLE INTEREST OR PLEASURE IN DOING THINGS: NOT AT ALL

## 2024-04-18 ASSESSMENT — PAIN SCALES - GENERAL: PAINLEVEL: 2

## 2024-04-18 NOTE — PROGRESS NOTES
Patient ID: Eric Tinajero is a 64 y.o. male.  Referring Physician: ANGIE Wiggins  [address] [place] [address]  Primary Care Provider: ANGIE Wiggins  Visit Type: Follow Up    Location: [facility]  Diagnosis/Reason: Monocytosis    HPI:  Eric Tinajero is a 64 y.o. male referred for consultation of monocytosis    Per review of records:  WBC count WNL historically  Monocytosis since at least 2/5/18 w/ monocyte range of 1.08 to 1.83  RBC indices and H&H WNL historically  Platelets WNL historically    Previous Hematological Background:  Hx of hematological disorders: No - Patient denies prior hematologic history  Hx of blood transfusions: No - Patient denies prior blood transfusion  Hx of iron supplementation: No - Denies any previous supplementation  Hx of B12 supplementation: No - Denies any prior supplementation  Hx of folate supplementation: No - Denies any prior supplementation    Today he reports his only reported c/o is peripheral neuropathy pain associated w/ lumbar issues.    Patient denies weight loss, abnormal bruising and bleeding, hematuria, blood in stool, dark/black stools, epistaxis, oral/gingival bleeding, lymphadenopathy, recurrent infections, recurrent fevers, night sweats, early satiety, abdominal pain, bone pain, chest pain, palpitations, SOB, MORGAN, fatigue, dizziness, lightheadedness, PICA.    PMHx:  Active Ambulatory Problems     Diagnosis Date Noted    Abdominal hernia 02/16/2023    Abnormal EKG 02/16/2023    Abnormal liver enzymes 02/16/2023    Acute bacterial pharyngitis 02/16/2023    Acute bronchitis 02/16/2023    Acute gout 02/16/2023    Atypical mole 02/16/2023    Cellulitis of second toe of left foot 02/16/2023    Cervical arthritis 02/16/2023    Chest pain 02/16/2023    Epididymitis 02/16/2023    Flank pain, acute 02/16/2023    Foot swelling 02/16/2023    Gastroesophageal reflux disease 02/16/2023    Heat intolerance 02/16/2023    History of peripheral neuropathy  02/16/2023    Hypertension 02/16/2023    Idiopathic progressive polyneuropathy 02/16/2023    Lumbar pain 02/16/2023    Obesity 02/16/2023    Pain of right tibia 02/16/2023    Plantar fasciitis, right 02/16/2023    Primary osteoarthritis of right knee 02/16/2023    Rib pain on left side 02/16/2023    Right foot pain 02/16/2023    Right shoulder pain 02/16/2023    Sigmoid diverticulosis 02/16/2023    Sinusitis 02/16/2023    Sore throat 02/16/2023    Trapezius muscle strain 02/16/2023    Type 2 diabetes mellitus (Multi) 02/16/2023     Resolved Ambulatory Problems     Diagnosis Date Noted    No Resolved Ambulatory Problems     Past Medical History:   Diagnosis Date    Acute upper respiratory infection, unspecified 02/17/2017    Acute upper respiratory infection, unspecified 02/17/2017    Arthritis     Diabetes mellitus (Multi)     Diverticulosis of intestine, part unspecified, without perforation or abscess without bleeding 07/20/2013    Essential (primary) hypertension 10/28/2022    Gout, unspecified 07/20/2013    Other acute sinusitis 01/12/2017    Other allergic rhinitis 01/14/2016    Other conditions influencing health status 07/20/2013    Personal history of other diseases of the respiratory system 01/12/2017    Personal history of other diseases of the respiratory system 02/17/2017    Spondylosis without myelopathy or radiculopathy, lumbar region 07/20/2013     PSHx:  Past Surgical History:   Procedure Laterality Date    COLONOSCOPY  06/24/2014    Complete Colonoscopy    CT ANGIO CORONARY ART WITH HEARTFLOW IF SCORE >30%  6/19/2020    CT HEART CORONARY ANGIOGRAM 6/19/2020 [facility]    HERNIA REPAIR  06/24/2014    Hernia Repair    KNEE SURGERY  06/24/2014    Knee Surgery      Multiple arthroscopic knee procedures    FHx:  Family History   Problem Relation Name Age of Onset    Diabetes Mother Nadeen Tinajero     Hypertension Mother Nadeen Howardt     Rectal cancer Mother Nadeen Howardt 50    Hypertension Father Carlos  Hunt     Diabetes Mother's Sister      Diabetes Father's Brother      Other (cardiac disorder) Other      Kidney disease Other      Liver disease Other      Stroke Other        Mother: Rosacea, Rectal Cancer  Father: Mass at root of mesentery deemed inaccessible, Mass in liver  Paternal grandmother:  2/2 endometrial cancer  Children: 3 - Oldest son, 18 y/o w/ CVA    Social Hx:  Eric Tinajero    reports that he has never smoked. He has never used smokeless tobacco.  He  reports that he does not currently use alcohol.  He  reports no history of drug use.  Social History     Socioeconomic History    Marital status:      Spouse name: None    Number of children: None    Years of education: None    Highest education level: None   Occupational History    None   Tobacco Use    Smoking status: Never    Smokeless tobacco: Never   Vaping Use    Vaping status: Never Used   Substance and Sexual Activity    Alcohol use: Not Currently    Drug use: Never    Sexual activity: Yes     Partners: Female     Birth control/protection: None     Comment: Wife perimenopausal   Other Topics Concern    None   Social History Narrative    None     Social Determinants of Health     Financial Resource Strain: Not on file   Food Insecurity: Not on file   Transportation Needs: Not on file   Physical Activity: Not on file   Stress: Not on file   Social Connections: Not on file   Intimate Partner Violence: Not on file   Housing Stability: Not on file      Living Situation: Lives at home w/ family  Occupation: Cytologist  Marital Status:   Alcohol Use: Denies  Smoking: Never smoker  Recreational Drug Use: Denies      Cancer Screenings:  Upper EGD: Denies  Colonoscopy: 24   Prostate/PSA screenings: 3/31/23  Lung cancer screenings: N/A - Never smoker    Medications and allergies reviewed in EMR.    ROS:  Review of Systems - Oncology   10 point review of systems negative except as state in HPI.    Vitals & Statistics:  Objective  "  BSA: 2.68 meters squared  BP (!) 151/99 (BP Location: Left arm, Patient Position: Sitting, BP Cuff Size: Large adult)   Pulse 91   Temp 35.6 °C (96.1 °F) (Temporal)   Resp 20   Ht (S) 1.936 m (6' 4.22\")   Wt 134 kg (295 lb 10.2 oz)   SpO2 97%   BMI 35.78 kg/m²     Physical Exam:  Physical Exam  Vitals and nursing note reviewed.   Constitutional:       Appearance: Normal appearance.   HENT:      Head: Normocephalic and atraumatic.      Right Ear: External ear normal.      Left Ear: External ear normal.      Nose: Nose normal.      Mouth/Throat:      Mouth: Mucous membranes are moist.      Pharynx: Oropharynx is clear.   Eyes:      General: Lids are normal.      Extraocular Movements: Extraocular movements intact.      Conjunctiva/sclera: Conjunctivae normal.      Pupils: Pupils are equal, round, and reactive to light.   Cardiovascular:      Rate and Rhythm: Normal rate and regular rhythm.      Pulses: Normal pulses.      Heart sounds: Normal heart sounds.   Pulmonary:      Effort: Pulmonary effort is normal.      Breath sounds: Normal breath sounds.   Abdominal:      General: Abdomen is flat. Bowel sounds are normal.      Palpations: Abdomen is soft.      Comments: No masses or organomegaly upon physical exam   Musculoskeletal:         General: Normal range of motion.      Cervical back: Normal range of motion.   Lymphadenopathy:      Comments: No lymphadenopathy palpable on physical exam   Skin:     General: Skin is warm and dry.      Capillary Refill: Capillary refill takes less than 2 seconds.      Comments: 1) Palpable, soft, mobile, non-tender mass, approximately dime-sized suspected to be lipoma  2) Small, approximately dime-sized area of erythematous lesion without drainage, pain, odor ate site. Surrounding skin warm to touch. Site appears to be a rash of undetermined origin. Patient plans to f/u w/ dermatology if site worsens   Neurological:      General: No focal deficit present.      Mental Status: " "He is alert and oriented to person, place, and time.   Psychiatric:         Mood and Affect: Mood normal.         Behavior: Behavior normal.         Thought Content: Thought content normal.         Judgment: Judgment normal.           Results:  Lab Results   Component Value Date    WBC 7.6 04/17/2024    NEUTROABS 3.46 04/17/2024    IGABSOL 0.05 04/17/2024    LYMPHSABS 2.13 04/17/2024    MONOSABS 1.73 (H) 04/17/2024    EOSABS 0.14 04/17/2024    BASOSABS 0.06 04/17/2024    RBC 5.44 04/17/2024    MCV 91 04/17/2024    MCHC 34.0 04/17/2024    HGB 16.9 04/17/2024    HCT 49.7 04/17/2024     04/17/2024     No results found for: \"RETICCTPCT\"   Lab Results   Component Value Date    CREATININE 0.89 04/17/2024    BUN 14 04/17/2024    EGFR >90 04/17/2024     04/17/2024    K 4.4 04/17/2024     04/17/2024    CO2 29 04/17/2024      Lab Results   Component Value Date    ALT 72 (H) 04/17/2024    AST 37 04/17/2024    ALKPHOS 45 04/17/2024    BILITOT 0.7 04/17/2024      Lab Results   Component Value Date    TSH 1.96 03/31/2023     Lab Results   Component Value Date    TSH 1.96 03/31/2023     Lab Results   Component Value Date    IRON 140 07/24/2023    TIBC 371 07/24/2023    FERRITIN 626 (H) 07/24/2023      No results found for: \"SWHHWHVD78\"   No results found for: \"FOLATE\"  Lab Results   Component Value Date    SELMA NEGATIVE 07/24/2023    RF <10 04/17/2024    SEDRATE 6 04/17/2024      Lab Results   Component Value Date    CRP <0.10 04/17/2024      No results found for: \"AGATA\"  No results found for: \"LDH\"  No results found for: \"HAPTOGLOBIN\"  No results found for: \"SPEP\"  Lab Results   Component Value Date     04/17/2024    IGM 91 04/17/2024     04/17/2024     Lab Results   Component Value Date    HEPBCIGM Nonreactive 04/17/2024    HEPBCAB NONREACTIVE 07/24/2023    HEPBSAG Nonreactive 04/17/2024    HEPCAB Nonreactive 04/17/2024     Lab Results   Component Value Date    HIV1X2 Nonreactive 04/17/2024 "       Assessment:  Eric Tinajero is a 64 y.o. male referred for consultation of monocytosis    Today he reports his only reported c/o is peripheral neuropathy pain associated w/ lumbar issues.    Physical exam     I reviewed patient's chart including but not limited to labs, imaging, surgical/procedure notes, pathology, hospital notes, doctor's notes.    7/24/23  SELMA: Negative    4/17/24 results:  WBC count WNL - Isolated monocytosis at 1.73  RBC indices and H&H WNL  Platelets WNL  Hepatic: ALT elevated - AST, ALKP, T. Bili all WNL - Likely 2/2 underlying hepatic steatosis  ESR, CRP, RF all WNL WNL  Igg's: IgG, IgA, IgM all WNL  HBV surface ag and core ab: Negative  HCV ab: Negative  HIV: Negative  BCR-ABL: Pending  Flow cytometry: Pending  NGS-MPN: Pending    Plan:  Discussed possible etiologies of monocytosis including: autoimmune disease, infection, inflammatory disorder, allergies, smoking, inherited, asplenia, medications, malignancy, etc. Will start hematological workup today.    Monocytosis  Lab results pending - Will review when available and address adverse results as needed  RTC in 2 weeks via virtual/telehealth visit - F/U sooner if needed/urgent    I had an extensive discussion with the patient regarding the diagnosis and discussed the plan of therapy, including general considerations regarding side effects and outcomes. Pt understood and gave appropriate teach back about the plan of care. All questions were answered to the patient's satisfaction. The patient is instructed to contact us at any time if questions or problems arise. Thank you for the opportunity to participate in the care of this very pleasant patient.    Total time = 30 minutes. 50% or more of this time was spent in counseling and/or coordination of care including reviewing medical history/radiology/labs, examining patient, formulating outlined plan with team, and discussing plan with patient/family.    Ramos Wang PA-C

## 2024-04-21 LAB
CELL COUNT (BLOOD): 7.41 X10*3/UL
CELL POPULATIONS: NORMAL
DIAGNOSIS: NORMAL
FLOW DIFFERENTIAL: NORMAL
FLOW TEST ORDERED: NORMAL
LAB TEST METHOD: NORMAL
NUMBER OF CELLS COLLECTED: NORMAL PER TUBE
PATH REPORT.TOTAL CANCER: NORMAL
SIGNATURE COMMENT: NORMAL
SPECIMEN VIABILITY: NORMAL

## 2024-04-22 ENCOUNTER — DOCUMENTATION (OUTPATIENT)
Dept: HEMATOLOGY/ONCOLOGY | Facility: HOSPITAL | Age: 64
End: 2024-04-22
Payer: COMMERCIAL

## 2024-04-22 DIAGNOSIS — D72.821 MONOCYTOSIS: Primary | ICD-10-CM

## 2024-04-22 NOTE — PROGRESS NOTES
Called patient approximately 10:20am to discuss Flow Cytometry results of ayptical monocytes w/ CD16+ and CD14 dim markers indicating possible CMML. Discussed with patient recommendation for BMBx and discussed options to have procedure with or without sedation. Patient verbalized understanding and verbalized desire to have the procedure without sedation. Order placed for BMBx. Scheduling staff to contact patient and schedule procedure.

## 2024-04-23 ENCOUNTER — LAB (OUTPATIENT)
Dept: LAB | Facility: LAB | Age: 64
End: 2024-04-23
Payer: COMMERCIAL

## 2024-04-23 LAB
ALBUMIN: 4.2 G/DL (ref 3.4–5)
ALPHA 1 GLOBULIN: 0.3 G/DL (ref 0.2–0.6)
ALPHA 2 GLOBULIN: 0.7 G/DL (ref 0.4–1.1)
BASOPHILS # BLD MANUAL: 0 X10*3/UL (ref 0–0.1)
BASOPHILS NFR BLD MANUAL: 0 %
BETA GLOBULIN: 0.8 G/DL (ref 0.5–1.2)
EOSINOPHIL # BLD MANUAL: 0 X10*3/UL (ref 0–0.7)
EOSINOPHIL NFR BLD MANUAL: 0 %
ERYTHROCYTE [DISTWIDTH] IN BLOOD BY AUTOMATED COUNT: 12 % (ref 11.5–14.5)
GAMMA GLOBULIN: 1 G/DL (ref 0.5–1.4)
HCT VFR BLD AUTO: 50 % (ref 41–52)
HGB BLD-MCNC: 17.4 G/DL (ref 13.5–17.5)
IMM GRANULOCYTES # BLD AUTO: 0.04 X10*3/UL (ref 0–0.7)
IMM GRANULOCYTES NFR BLD AUTO: 0.5 % (ref 0–0.9)
LYMPHOCYTES # BLD MANUAL: 1.99 X10*3/UL (ref 1.2–4.8)
LYMPHOCYTES NFR BLD MANUAL: 24 %
MCH RBC QN AUTO: 30.9 PG (ref 26–34)
MCHC RBC AUTO-ENTMCNC: 34.8 G/DL (ref 32–36)
MCV RBC AUTO: 89 FL (ref 80–100)
MONOCYTES # BLD MANUAL: 1.41 X10*3/UL (ref 0.1–1)
MONOCYTES NFR BLD MANUAL: 17 %
NEUTS SEG # BLD MANUAL: 4.9 X10*3/UL (ref 1.2–7)
NEUTS SEG NFR BLD MANUAL: 59 %
NRBC BLD-RTO: 0 /100 WBCS (ref 0–0)
PATH REVIEW-SERUM PROTEIN ELECTROPHORESIS: NORMAL
PLATELET # BLD AUTO: 215 X10*3/UL (ref 150–450)
POLYCHROMASIA BLD QL SMEAR: ABNORMAL
PROTEIN ELECTROPHORESIS COMMENT: NORMAL
RBC # BLD AUTO: 5.63 X10*6/UL (ref 4.5–5.9)
RBC MORPH BLD: ABNORMAL
TOTAL CELLS COUNTED BLD: 100
WBC # BLD AUTO: 8.3 X10*3/UL (ref 4.4–11.3)

## 2024-04-23 PROCEDURE — 85027 COMPLETE CBC AUTOMATED: CPT

## 2024-04-23 PROCEDURE — 36415 COLL VENOUS BLD VENIPUNCTURE: CPT

## 2024-04-23 PROCEDURE — 85007 BL SMEAR W/DIFF WBC COUNT: CPT

## 2024-04-24 ENCOUNTER — PROCEDURE VISIT (OUTPATIENT)
Dept: OTHER | Facility: HOSPITAL | Age: 64
End: 2024-04-24
Payer: COMMERCIAL

## 2024-04-24 VITALS
WEIGHT: 298.06 LBS | RESPIRATION RATE: 18 BRPM | OXYGEN SATURATION: 98 % | HEART RATE: 86 BPM | TEMPERATURE: 96.1 F | BODY MASS INDEX: 36.07 KG/M2 | DIASTOLIC BLOOD PRESSURE: 91 MMHG | SYSTOLIC BLOOD PRESSURE: 155 MMHG

## 2024-04-24 DIAGNOSIS — D72.829 LEUKOCYTOSIS, UNSPECIFIED TYPE: Primary | ICD-10-CM

## 2024-04-24 LAB
CHROM ANALY OVERALL INTERP-IMP: NORMAL
ELECTRONICALLY COSIGNED BY CYTOGENETICS: NORMAL
ELECTRONICALLY SIGNED BY CYTOGENETICS: NORMAL
ELECTRONICALLY SIGNED BY: NORMAL
FISH ISCN RESULTS: NORMAL
MYELOID NGS RESULTS: NORMAL

## 2024-04-24 PROCEDURE — 88313 SPECIAL STAINS GROUP 2: CPT | Performed by: PATHOLOGY

## 2024-04-24 PROCEDURE — 88271 CYTOGENETICS DNA PROBE: CPT | Performed by: PHYSICIAN ASSISTANT

## 2024-04-24 PROCEDURE — 38222 DX BONE MARROW BX & ASPIR: CPT | Performed by: PHYSICIAN ASSISTANT

## 2024-04-24 PROCEDURE — 88341 IMHCHEM/IMCYTCHM EA ADD ANTB: CPT | Performed by: PATHOLOGY

## 2024-04-24 PROCEDURE — 88185 FLOWCYTOMETRY/TC ADD-ON: CPT | Mod: TC | Performed by: PHYSICIAN ASSISTANT

## 2024-04-24 PROCEDURE — 88305 TISSUE EXAM BY PATHOLOGIST: CPT | Mod: TC,SUR | Performed by: PHYSICIAN ASSISTANT

## 2024-04-24 PROCEDURE — 88342 IMHCHEM/IMCYTCHM 1ST ANTB: CPT | Performed by: PATHOLOGY

## 2024-04-24 PROCEDURE — 88264 CHROMOSOME ANALYSIS 20-25: CPT | Performed by: PHYSICIAN ASSISTANT

## 2024-04-24 PROCEDURE — 88189 FLOWCYTOMETRY/READ 16 & >: CPT | Performed by: PATHOLOGY

## 2024-04-24 PROCEDURE — 88305 TISSUE EXAM BY PATHOLOGIST: CPT | Performed by: PATHOLOGY

## 2024-04-24 PROCEDURE — 88291 CYTO/MOLECULAR REPORT: CPT | Performed by: PHYSICIAN ASSISTANT

## 2024-04-24 PROCEDURE — 88311 DECALCIFY TISSUE: CPT | Performed by: PATHOLOGY

## 2024-04-24 PROCEDURE — 85097 BONE MARROW INTERPRETATION: CPT | Mod: TC | Performed by: PHYSICIAN ASSISTANT

## 2024-04-24 ASSESSMENT — PAIN SCALES - GENERAL: PAINLEVEL: 0-NO PAIN

## 2024-04-24 NOTE — PROGRESS NOTES
Patient ID: Eric Tinajero is a 64 y.o. male.    Bone marrow aspirate & biopsy    Date/Time: 4/24/2024 8:20 AM    Performed by: Janes Negro PA-C  Authorized by: Janes Negro PA-C    Consent:     Consent obtained:  Verbal and written    Consent given by:  Patient    Risks, benefits, and alternatives were discussed: yes      Risks discussed:  Bleeding, infection and pain  Universal protocol:     Procedure explained and questions answered to patient or proxy's satisfaction: yes      Relevant documents present and verified: yes      Test results available: yes      Imaging studies available: yes      Required blood products, implants, devices, and special equipment available: yes      Site/side marked: yes      Immediately prior to procedure, a time out was called: yes      Patient identity confirmed:  Verbally with patient  Indications:     Indications:  Monocytosis  Pre-procedure details:     Skin preparation:  Chlorhexidine    Preparation: Patient was prepped and draped in the usual sterile fashion    Sedation:     Sedation type:  None  Anesthesia:     Anesthesia method:  Local infiltration    Local anesthetic:  Lidocaine 1% w/o epi  Procedure specific details:      The procedure was explained & potential complications reviewed with the patient including the risks for bleeding, infection, & discomfort at the bone marrow biopsy site. The patient was given time for questions regarding the procedure. The patient agreed to proceed & electronic signed consent was obtained.  The patient's most recent history & physical exam were reviewed & relevant findings were noted.  The patient's allergy history was reviewed.  Next, a pre-procedure time out was performed to properly identify the patient & the procedure to be performed.  The patient was placed in the prone position & the left posterior iliac crest bone marrow biopsy site was identified & marked.  Next, the skin at the marked bone marrow biopsy site was  cleansed with Chlorhexidine solution & sterile drapes were placed.  The skin, subcutaneous tissue, & periosteum below the marked bone marrow biopsy site were anesthetized using 10 ml of 1% Lidocaine solution.  Next, a Jamshidi needle was inserted at the marked biopsy site & slowly advanced into the posterior iliac crest.  Marrow aspirate & core biopsy were obtained & sent to Pathology for review & testing.  The needle was removed & sterile dressing was applied to the biopsy site.  The paatient tolerated the procedure well without incident.  Prior to discharge, the biopsy site was inspected & the patient was given written post procedure care instructions.   Post-procedure details:     Procedure completion:  Tolerated well, no immediate complications

## 2024-04-24 NOTE — PROGRESS NOTES
Patient arrived to ASCT unit via self-ambulation for bone marrow biopsy. He is alert and oriented x3, denies pain or any discomfort. Vital signs obtained. Blood drawn yesterday. Bran Negro NP performed procedure. Dressing is clean, dry and intact. Education provided and PI sheet given. No adverse reactions, no complaints and no assistance needed.

## 2024-05-01 LAB
CELL COUNT (BLOOD): 8.66 X10*3/UL
CELL POPULATIONS: NORMAL
DIAGNOSIS: NORMAL
FLOW DIFFERENTIAL: NORMAL
FLOW TEST ORDERED: NORMAL
LAB TEST METHOD: NORMAL
NUMBER OF CELLS COLLECTED: NORMAL
PATH REPORT.TOTAL CANCER: NORMAL
SIGNATURE COMMENT: NORMAL
SPECIMEN VIABILITY: NORMAL

## 2024-05-02 ENCOUNTER — TELEMEDICINE (OUTPATIENT)
Dept: HEMATOLOGY/ONCOLOGY | Facility: HOSPITAL | Age: 64
End: 2024-05-02
Payer: COMMERCIAL

## 2024-05-02 DIAGNOSIS — D72.821 MONOCYTOSIS: Primary | ICD-10-CM

## 2024-05-02 PROCEDURE — 99214 OFFICE O/P EST MOD 30 MIN: CPT | Performed by: PHYSICIAN ASSISTANT

## 2024-05-02 PROCEDURE — 3044F HG A1C LEVEL LT 7.0%: CPT | Performed by: PHYSICIAN ASSISTANT

## 2024-05-02 PROCEDURE — 4010F ACE/ARB THERAPY RXD/TAKEN: CPT | Performed by: PHYSICIAN ASSISTANT

## 2024-05-02 PROCEDURE — 3048F LDL-C <100 MG/DL: CPT | Performed by: PHYSICIAN ASSISTANT

## 2024-05-02 NOTE — PROGRESS NOTES
"Patient ID: Eric Tinajero is a 64 y.o. male.  Referring Physician: No referring provider defined for this encounter.  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 male following up today for monocytosis (suspected clonal monocytosis of undetermined significance vs CMML)    NOTE:  4/22/24 - Called patient approximately 10:20am to discuss Flow Cytometry results of ayptical monocytes w/ CD16+ and CD14 dim markers indicating possible CMML. Discussed with patient recommendation for BMBx and discussed options to have procedure with or without sedation. Patient verbalized understanding and verbalized desire to have the procedure without sedation. Order placed for BMBx. Scheduling staff to contact patient and schedule procedure.  4/24/24 - Patient had BMBx completed 4/24/24 and results are noted below under \"results\" section of this note    Patient denies weight loss, abnormal bruising and bleeding, hematuria, blood in stool, dark/black stools, epistaxis, oral/gingival bleeding, lymphadenopathy, recurrent infections, recurrent fevers, night sweats, early satiety, abdominal pain, bone pain, chest pain, palpitations, SOB, MORGAN, fatigue, dizziness, lightheadedness, PICA.    Relevant Hx:  4/18/24 - Initial Consult  Eric Tinajero is a 64 y.o. male referred for consultation of monocytosis    Per review of records:  WBC count WNL historically  Monocytosis since at least 2/5/18 w/ monocyte range of 1.08 to 1.83  RBC indices and H&H WNL historically  Platelets WNL historically    Previous Hematological Background:  Hx of hematological disorders: No - Patient denies prior hematologic history  Hx of blood transfusions: No - Patient denies prior blood transfusion  Hx of iron supplementation: No - Denies any previous supplementation  Hx of " B12 supplementation: No - Denies any prior supplementation  Hx of folate supplementation: No - Denies any prior supplementation    Today he reports his only reported c/o is peripheral neuropathy pain associated w/ lumbar issues.    Patient denies weight loss, abnormal bruising and bleeding, hematuria, blood in stool, dark/black stools, epistaxis, oral/gingival bleeding, lymphadenopathy, recurrent infections, recurrent fevers, night sweats, early satiety, abdominal pain, bone pain, chest pain, palpitations, SOB, MORGAN, fatigue, dizziness, lightheadedness, PICA.    PMHx:  Active Ambulatory Problems     Diagnosis Date Noted   • Abdominal hernia 02/16/2023   • Abnormal EKG 02/16/2023   • Abnormal liver enzymes 02/16/2023   • Acute bacterial pharyngitis 02/16/2023   • Acute bronchitis 02/16/2023   • Acute gout 02/16/2023   • Atypical mole 02/16/2023   • Cellulitis of second toe of left foot 02/16/2023   • Cervical arthritis 02/16/2023   • Chest pain 02/16/2023   • Epididymitis 02/16/2023   • Flank pain, acute 02/16/2023   • Foot swelling 02/16/2023   • Gastroesophageal reflux disease 02/16/2023   • Heat intolerance 02/16/2023   • History of peripheral neuropathy 02/16/2023   • Hypertension 02/16/2023   • Idiopathic progressive polyneuropathy 02/16/2023   • Lumbar pain 02/16/2023   • Obesity 02/16/2023   • Pain of right tibia 02/16/2023   • Plantar fasciitis, right 02/16/2023   • Primary osteoarthritis of right knee 02/16/2023   • Rib pain on left side 02/16/2023   • Right foot pain 02/16/2023   • Right shoulder pain 02/16/2023   • Sigmoid diverticulosis 02/16/2023   • Sinusitis 02/16/2023   • Sore throat 02/16/2023   • Trapezius muscle strain 02/16/2023   • Type 2 diabetes mellitus (Multi) 02/16/2023     Resolved Ambulatory Problems     Diagnosis Date Noted   • No Resolved Ambulatory Problems     Past Medical History:   Diagnosis Date   • Acute upper respiratory infection, unspecified 02/17/2017   • Acute upper respiratory  infection, unspecified 2017   • Arthritis    • Diabetes mellitus (Multi)    • Diverticulosis of intestine, part unspecified, without perforation or abscess without bleeding 2013   • Essential (primary) hypertension 10/28/2022   • Gout, unspecified 2013   • Other acute sinusitis 2017   • Other allergic rhinitis 2016   • Other conditions influencing health status 2013   • Personal history of other diseases of the respiratory system 2017   • Personal history of other diseases of the respiratory system 2017   • Spondylosis without myelopathy or radiculopathy, lumbar region 2013     PSHx:  Past Surgical History:   Procedure Laterality Date   • COLONOSCOPY  2014    Complete Colonoscopy   • CT ANGIO CORONARY ART WITH HEARTFLOW IF SCORE >30%  2020    CT HEART CORONARY ANGIOGRAM 2020 [facility]   • HERNIA REPAIR  2014    Hernia Repair   • KNEE SURGERY  2014    Knee Surgery      Multiple arthroscopic knee procedures    FHx:  Family History   Problem Relation Name Age of Onset   • Diabetes Mother Nadeen Tinajero    • Hypertension Mother Nadeen Tinajero    • Rectal cancer Mother Nadeen Tinajero 50   • Hypertension Father Carlos Tinajero    • Diabetes Mother's Sister     • Diabetes Father's Brother     • Other (cardiac disorder) Other     • Kidney disease Other     • Liver disease Other     • Stroke Other        Mother: Rosacea, Rectal Cancer  Father: Mass at root of mesentery deemed inaccessible, Mass in liver  Paternal grandmother:  2/2 endometrial cancer  Children: 3 - Oldest son, 18 y/o w/ CVA    Social Hx:  Eric Tinajero    reports that he has never smoked. He has never used smokeless tobacco.  He  reports that he does not currently use alcohol.  He  reports no history of drug use.  Social History     Socioeconomic History   • Marital status:      Spouse name: Not on file   • Number of children: Not on file   • Years of education: Not on  "file   • Highest education level: Not on file   Occupational History   • Not on file   Tobacco Use   • Smoking status: Never   • Smokeless tobacco: Never   Vaping Use   • Vaping status: Never Used   Substance and Sexual Activity   • Alcohol use: Not Currently   • Drug use: Never   • Sexual activity: Yes     Partners: Female     Birth control/protection: None     Comment: Wife perimenopausal   Other Topics Concern   • Not on file   Social History Narrative   • Not on file     Social Determinants of Health     Financial Resource Strain: Not on file   Food Insecurity: Not on file   Transportation Needs: Not on file   Physical Activity: Not on file   Stress: Not on file   Social Connections: Not on file   Intimate Partner Violence: Not on file   Housing Stability: Not on file      Living Situation: Lives at home w/ family  Occupation: Cytologist  Marital Status:   Alcohol Use: Denies  Smoking: Never smoker  Recreational Drug Use: Denies      Cancer Screenings:  Upper EGD: Denies  Colonoscopy: 9/28/22   Prostate/PSA screenings: 3/31/23  Lung cancer screenings: N/A - Never smoker    Medications and allergies reviewed in EMR.    ROS:  Review of Systems - Oncology   10 point review of systems negative except as state in HPI.    Vitals & Statistics:  Objective   BSA: There is no height or weight on file to calculate BSA.  There were no vitals taken for this visit.    Physical Exam:  N/A - Virtual visit    Results:  Lab Results   Component Value Date    WBC 8.3 04/23/2024    NEUTROABS 3.46 04/17/2024    IGABSOL 0.04 04/23/2024    LYMPHSABS 2.13 04/17/2024    MONOSABS 1.73 (H) 04/17/2024    EOSABS 0.00 04/23/2024    BASOSABS 0.00 04/23/2024    RBC 5.63 04/23/2024    MCV 89 04/23/2024    MCHC 34.8 04/23/2024    HGB 17.4 04/23/2024    HCT 50.0 04/23/2024     04/23/2024     No results found for: \"RETICCTPCT\"   Lab Results   Component Value Date    CREATININE 0.89 04/17/2024    BUN 14 04/17/2024    EGFR >90 " "04/17/2024     04/17/2024    K 4.4 04/17/2024     04/17/2024    CO2 29 04/17/2024      Lab Results   Component Value Date    ALT 72 (H) 04/17/2024    AST 37 04/17/2024    ALKPHOS 45 04/17/2024    BILITOT 0.7 04/17/2024      Lab Results   Component Value Date    TSH 1.96 03/31/2023     Lab Results   Component Value Date    TSH 1.96 03/31/2023     Lab Results   Component Value Date    IRON 140 07/24/2023    TIBC 371 07/24/2023    FERRITIN 626 (H) 07/24/2023      No results found for: \"HGTMZMXF19\"   No results found for: \"FOLATE\"  Lab Results   Component Value Date    SELMA Negative 04/17/2024    RF <10 04/17/2024    SEDRATE 6 04/17/2024      Lab Results   Component Value Date    CRP <0.10 04/17/2024      No results found for: \"AGATA\"  No results found for: \"LDH\"  No results found for: \"HAPTOGLOBIN\"  Lab Results   Component Value Date    SPEP Normal. 04/17/2024     Lab Results   Component Value Date     04/17/2024    IGM 91 04/17/2024     04/17/2024     Lab Results   Component Value Date    HEPBCIGM Nonreactive 04/17/2024    HEPBCAB NONREACTIVE 07/24/2023    HEPBSAG Nonreactive 04/17/2024    HEPCAB Nonreactive 04/17/2024     Lab Results   Component Value Date    HIV1X2 Nonreactive 04/17/2024       Assessment:  Eric Tinajero is a 64 y.o. male following up today for monocytosis (suspected clonal monocytosis of undetermined significance vs CMML)    Today he reports his only reported c/o is peripheral neuropathy pain associated w/ lumbar issues.    I reviewed patient's chart including but not limited to labs, imaging, surgical/procedure notes, pathology, hospital notes, doctor's notes.    7/24/23  SELMA: Negative    4/17/24 results:  WBC count WNL - Isolated monocytosis at 1.73  RBC indices and H&H WNL  Platelets WNL  Hepatic: ALT elevated - AST, ALKP, T. Bili all WNL - Likely 2/2 underlying hepatic steatosis  ESR, CRP, RF all WNL WNL  Igg's: IgG, IgA, IgM all WNL  HBV surface ag and core ab: " "Negative  HCV ab: Negative  HIV: Negative  BCR-ABL: Negative  Flow cytometry:   Relative monocytosis with atypical partitioning of monocytes, see note --No immunophenotypic evidence of a lymphoproliferative disorder. --No increased or abnormal blast population. --No abnormality of granulocytes.  Note:  Monocytes demonstrate an atypical partitioning of classical and non-classical monocytes with few non-classical (CD16+CD14 dim ) monocytes detected. The finding may be seen in chronic myelomonocytic leukemia  (Blood (2015) 125 (23): [id number]). No other abnormality was detected, however. Clinical and morphological correlation suggested.  NGS-MPN:   TET2 p.*fs*1 - VAF: 22%  TET2 p.* - VAF: 22%    4/24/24 BMBx Results:  Bone Marrow Aspiration and Exam:  \"NORMOCELLULAR BONE MARROW (40%) WITH MILD MONOCYTOSIS AND NORMAL TRILINEAGE HEMATOPOIESIS, SEE NOTE   NOTE: There is a peripheral monocytosis which appears persistent based on review of the medical record. Morphologically, no definite dysplasia is noted and monocytes appear mostly normal. Blasts are not increased. By flow cytometry, a reduction in non-classical monocytes is noted without other abnormalities. Recent myeloid NGS from the blood demonstrated two TET2 alterations at variant allele fraction (VAF) of 22%. 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 Of note WHO 2022 classification does not include CMML-0 which the process would have been previously been categorized. Moreover, by ICC 2022 classification, the process is based considered a clonal monocytosis of undetermined significance since there is no overt evidence of dyspoiesis or increase in blasts in the marrow or blood.  Additional genetic studies are pending. Clinical correlation recommended\"  Flow " "Cytometry (Bone Marrow):   \"Monocytosis with atypical partitioning of classical and non-classical monocytes, see note. No immunophenotypic evidence of a lymphoproliferative disorder. No increased or abnormal blast population. No abnormality of granulocytes.  Note:  The findings are similar to that seen in the peripheral blood. The sample appears somewhat hemodilute with few erythroid (CD71+CD45-) and granulocyte progenitors. 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 Pt understood and gave appropriate teach back about the plan of care. All questions were answered to the patient's satisfaction. The patient is instructed to contact us at any time if questions or problems arise. Thank you for the opportunity to participate in the care of this very pleasant patient.    Total time = 30 minutes. 50% or more of this time was spent in counseling and/or coordination of care including reviewing medical history/radiology/labs, examining patient, formulating outlined plan with team, and discussing plan with patient/family.    Ramos Wang PA-C     "

## 2024-05-03 ENCOUNTER — DOCUMENTATION (OUTPATIENT)
Dept: HEMATOLOGY/ONCOLOGY | Facility: HOSPITAL | Age: 64
End: 2024-05-03
Payer: COMMERCIAL

## 2024-05-03 NOTE — PROGRESS NOTES
5/3/24 1100  Received referral for this patient from Ramos Wang. Patient was referred for monocytosis and in further work up including bone marrow bx, patient is found to have a TET2 mutation and is referred to mal heme. Patient is scheduled with Dr. Garcia on 5/20/24 as per his preference. I have spoken with patient to confirm appointment details. CARMEN Medeiros

## 2024-05-06 LAB — SCAN RESULT: NORMAL

## 2024-05-10 LAB
CHROM ANALY OVERALL INTERP-IMP: NORMAL
ELECTRONICALLY COSIGNED BY CYTOGENETICS: NORMAL
ELECTRONICALLY SIGNED BY CYTOGENETICS: NORMAL
FISH ISCN RESULTS: NORMAL

## 2024-05-11 DIAGNOSIS — E11.9 TYPE 2 DIABETES MELLITUS WITHOUT COMPLICATION, WITHOUT LONG-TERM CURRENT USE OF INSULIN (MULTI): ICD-10-CM

## 2024-05-13 DIAGNOSIS — E11.9 TYPE 2 DIABETES MELLITUS WITHOUT COMPLICATION, WITHOUT LONG-TERM CURRENT USE OF INSULIN (MULTI): ICD-10-CM

## 2024-05-13 RX ORDER — METFORMIN HYDROCHLORIDE 500 MG/1
500 TABLET ORAL
Qty: 60 TABLET | Refills: 0 | OUTPATIENT
Start: 2024-05-13

## 2024-05-13 RX ORDER — METFORMIN HYDROCHLORIDE 500 MG/1
500 TABLET ORAL
Qty: 60 TABLET | Refills: 0 | Status: SHIPPED | OUTPATIENT
Start: 2024-05-13 | End: 2024-05-24 | Stop reason: SDUPTHER

## 2024-05-20 ENCOUNTER — OFFICE VISIT (OUTPATIENT)
Dept: HEMATOLOGY/ONCOLOGY | Facility: HOSPITAL | Age: 64
End: 2024-05-20
Payer: COMMERCIAL

## 2024-05-20 VITALS
RESPIRATION RATE: 16 BRPM | OXYGEN SATURATION: 96 % | HEART RATE: 90 BPM | DIASTOLIC BLOOD PRESSURE: 81 MMHG | WEIGHT: 295.3 LBS | BODY MASS INDEX: 35.74 KG/M2 | TEMPERATURE: 96.8 F | SYSTOLIC BLOOD PRESSURE: 143 MMHG

## 2024-05-20 DIAGNOSIS — D72.821 MONOCYTOSIS: ICD-10-CM

## 2024-05-20 DIAGNOSIS — C93.10 CHRONIC MYELOMONOCYTIC LEUKEMIA NOT HAVING ACHIEVED REMISSION (MULTI): Primary | ICD-10-CM

## 2024-05-20 PROBLEM — R10.9 FLANK PAIN, ACUTE: Status: RESOLVED | Noted: 2023-02-16 | Resolved: 2024-05-20

## 2024-05-20 PROBLEM — S46.819A TRAPEZIUS MUSCLE STRAIN: Status: RESOLVED | Noted: 2023-02-16 | Resolved: 2024-05-20

## 2024-05-20 PROBLEM — K76.0 HEPATIC STEATOSIS: Status: ACTIVE | Noted: 2023-02-16

## 2024-05-20 PROBLEM — R68.89 HEAT INTOLERANCE: Status: RESOLVED | Noted: 2023-02-16 | Resolved: 2024-05-20

## 2024-05-20 PROBLEM — B96.89 ACUTE BACTERIAL PHARYNGITIS: Status: RESOLVED | Noted: 2023-02-16 | Resolved: 2024-05-20

## 2024-05-20 PROBLEM — J32.9 SINUSITIS: Status: RESOLVED | Noted: 2023-02-16 | Resolved: 2024-05-20

## 2024-05-20 PROBLEM — J20.9 ACUTE BRONCHITIS: Status: RESOLVED | Noted: 2023-02-16 | Resolved: 2024-05-20

## 2024-05-20 PROBLEM — J02.8 ACUTE BACTERIAL PHARYNGITIS: Status: RESOLVED | Noted: 2023-02-16 | Resolved: 2024-05-20

## 2024-05-20 PROBLEM — R07.9 CHEST PAIN: Status: RESOLVED | Noted: 2023-02-16 | Resolved: 2024-05-20

## 2024-05-20 PROBLEM — N45.1 EPIDIDYMITIS: Status: RESOLVED | Noted: 2023-02-16 | Resolved: 2024-05-20

## 2024-05-20 PROBLEM — M79.89 FOOT SWELLING: Status: RESOLVED | Noted: 2023-02-16 | Resolved: 2024-05-20

## 2024-05-20 PROBLEM — J02.9 SORE THROAT: Status: RESOLVED | Noted: 2023-02-16 | Resolved: 2024-05-20

## 2024-05-20 PROBLEM — R07.81 RIB PAIN ON LEFT SIDE: Status: RESOLVED | Noted: 2023-02-16 | Resolved: 2024-05-20

## 2024-05-20 PROBLEM — M72.2 PLANTAR FASCIITIS, RIGHT: Status: RESOLVED | Noted: 2023-02-16 | Resolved: 2024-05-20

## 2024-05-20 PROBLEM — L03.032 CELLULITIS OF SECOND TOE OF LEFT FOOT: Status: RESOLVED | Noted: 2023-02-16 | Resolved: 2024-05-20

## 2024-05-20 LAB
BAND RESOLUTION: 400 BANDS
CHROM ANALY OVERALL INTERP-IMP: NORMAL
CHROMOSOME ANALYSIS CELLS ANALYZED: 7 CELLS
CHROMOSOME ANALYSIS CELLS IMAGED: 4 CELLS
CHROMOSOME ANALYSIS HYPERMODAL CELL COUNT: 0 CELLS
CHROMOSOME ANALYSIS HYPOMODAL CELL COUNT: 0 CELLS
CHROMOSOME ANALYSIS MODAL CHROMOSOME NO: 46 CHROMOSOMES
CHROMOSOME ANALYSIS STAINING METHOD: NORMAL
ELECTRONICALLY SIGNED BY CYTOGENETICS: NORMAL
KARYOTYP MAR: 2 CELLS
SPECIMEN CONDITION: NORMAL
TOTAL CELLS COUNTED MAR: 7 CELLS

## 2024-05-20 PROCEDURE — 3044F HG A1C LEVEL LT 7.0%: CPT | Performed by: INTERNAL MEDICINE

## 2024-05-20 PROCEDURE — 3079F DIAST BP 80-89 MM HG: CPT | Performed by: INTERNAL MEDICINE

## 2024-05-20 PROCEDURE — 3077F SYST BP >= 140 MM HG: CPT | Performed by: INTERNAL MEDICINE

## 2024-05-20 PROCEDURE — 99214 OFFICE O/P EST MOD 30 MIN: CPT | Performed by: INTERNAL MEDICINE

## 2024-05-20 PROCEDURE — 3048F LDL-C <100 MG/DL: CPT | Performed by: INTERNAL MEDICINE

## 2024-05-20 PROCEDURE — 4010F ACE/ARB THERAPY RXD/TAKEN: CPT | Performed by: INTERNAL MEDICINE

## 2024-05-20 ASSESSMENT — PAIN SCALES - GENERAL: PAINLEVEL_OUTOF10: 8

## 2024-05-20 NOTE — PROGRESS NOTES
Patient ID: Eric Tinajero is a 64 y.o. male.  Referring Physician: Ramos Wang PA-C  [id number] [id number]  Primary Care Provider: SAHIL Wiggins-CNP    Date of Service:  5/20/2024    ASSESSMENT and PLAN:      Problem List Items Addressed This Visit       CMML (chronic myelomonocytic leukemia) (Multi) - Primary    Current Assessment & Plan     5/20/2024: Long discussion with patient today.  Technically by WHO, we could actually diagnose a malignancy, but I believe he is a good example of why the ICC classification system is often preferred by clinicians.  He has no evidence of significant cytopenias with a mild monocytosis and evidence of a clonal myeloid population with TET2 mutations.  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  I discussed that this is likely best thought of is a premalignant condition, but not 1 that are able to intervene in a proactive manner on with known changes to long-term outcomes.  Therefore the primary recommendation is observation.  I typically recommend every 3 to 6 months, and I reviewed signs and symptoms including new onset splenomegaly symptoms (left upper quadrant pain, early satiety), or unexplained B symptoms, or signs and symptoms of of new onset anemia such as unexplained fatigue, or B symptoms such as fevers, chills, night sweats that do not have an alternative cause.  All of these should warrant an interval visit as well.  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  Initial evaluation noted monocytosis since 2/5/2018, ranging 1.08-1.83 and workup identified monocytic atypia.  Was arranged for bone marrow biopsy on 4/24/2024, which demonstrated: -- NORMOCELLULAR BONE MARROW (40%) WITH MILD MONOCYTOSIS AND NORMAL TRILINEAGE HEMATOPOIESIS, SEE NOTE.   NOTE: There is a peripheral monocytosis which appears persistent based on review of the medical record. Morphologically, no definite dysplasia is noted and monocytes appear mostly normal. Blasts are not increased. By flow cytometry, a reduction in non-classical monocytes is noted without other abnormalities. Recent myeloid NGS from the blood demonstrated two TET2 alterations at variant allele fraction (VAF) of 22%. 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 Of note WHO 2022 classification does not include CMML-0 which the process would have been previously been categorized. Moreover, by ICC 2022 classification, the process is based considered a clonal monocytosis of undetermined significance since there is no overt evidence of dyspoiesis or increase in blasts in the marrow or blood.  Additional genetic studies are pending. Clinical correlation recommended.  Molecular: TET2 mutations x2 (max VAF 22%)  Karyotype: 4/17 PB FISH for BCR-ABL negative. FISH negative for deletion 5q/monosomy 5, deletion 7q/monosomy 7, trisomy 8, deletion 17p/monosomy 17, and deletion 20q.  Break apart FISH negative for FGFR1 rearrangement and KMT2A rearrangements.  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 Has lots of knowledge - he noted that his PCP has found that it was only slightly elevated, and noted that it was continuing to rise.  Seems like it's only gone upward over time.                Past Medical History:  Peripheral neuropathy - idiopathic, that is advancing - unfortunately, not treatment  Hepatic steatosis - sees hepatology  Hypertension - on lisinopril  Osteoarthritis - on glucosamine  Diabetes: last A1c in the 7s per his memory - on oral medications right now.           Past Surgical History:  History of four knee surgeries; no replacement  Repair of hernia     Family History:   Father: abdomlinal aneurysm  Mother: aortic aneurysm, diabetic  Grandfather, paternal: stroke  18 y/o son had a CVA         Social History:  Cytology supervisor in cytopathology  Lives with spouse  Three grown sons       Review of Systems   Constitutional:  Negative for activity change, appetite change, chills, diaphoresis, fatigue, fever and unexpected weight change.   HENT:  Negative for congestion, mouth sores, nosebleeds, rhinorrhea, sinus pressure, sinus pain and sore throat.    Eyes:  Negative for visual disturbance.   Respiratory:  Negative for cough and shortness of breath.    Cardiovascular:  Negative for chest pain and leg swelling.   Gastrointestinal:  Negative for abdominal pain, constipation, diarrhea, nausea and vomiting.   Genitourinary:  Negative for difficulty urinating and flank pain.   Musculoskeletal:  Negative for back pain and joint swelling.        Admits to chronic joint pain   Skin:  Negative for rash.   Neurological:  Negative for weakness, light-headedness, numbness and headaches.   Hematological:  Negative for adenopathy. Does not bruise/bleed easily.    Psychiatric/Behavioral:  Negative for confusion and dysphoric mood. The patient is not nervous/anxious.        Home Medications and Adherence Reviewed with Patient.       Objective      VS:  /81 (BP Location: Left arm, Patient Position: Sitting, BP Cuff Size: Large adult)   Pulse 90   Temp 36 °C (96.8 °F) (Skin)   Resp 16   Wt 134 kg (295 lb 4.8 oz)   SpO2 96%   BMI 35.74 kg/m²   BSA: 2.68 meters squared    Physical Exam  Constitutional:       Appearance: Normal appearance.   HENT:      Mouth/Throat:      Mouth: Mucous membranes are moist.   Eyes:      Conjunctiva/sclera: Conjunctivae normal.      Pupils: Pupils are equal, round, and reactive to light.   Abdominal:      General: Abdomen is flat.      Palpations: Abdomen is soft. There is no splenomegaly.   Musculoskeletal:         General: Normal range of motion.   Lymphadenopathy:      Head:      Right side of head: No submental, submandibular, tonsillar, preauricular, posterior auricular or occipital adenopathy.      Left side of head: No submental, submandibular, tonsillar, preauricular, posterior auricular or occipital adenopathy.      Cervical: No cervical adenopathy.      Right cervical: No superficial cervical adenopathy.     Left cervical: No superficial cervical adenopathy.   Skin:     General: Skin is warm and dry.   Neurological:      General: No focal deficit present.      Mental Status: He is oriented to person, place, and time.   Psychiatric:         Mood and Affect: Mood normal.         Behavior: Behavior normal.         Laboratory:  Orders Only on 04/22/2024   Component Date Value Ref Range Status    Case Report 04/24/2024    Final                    Value:Surgical Pathology                                Case: [id number]                                  Authorizing Provider:  Ramos Wang PA-C       Collected:           04/24/2024 0844              Ordering Location:     [facility]   Received:            04/24/2024  0856                                     [place]                                                                       Pathologist:           Jose David Champion MD                                                        Specimens:   A) - BONE MARROW CLOT, LEFT ILIAC CREST                                                             B) - BONE MARROW CORE BIOPSY, LEFT ILIAC CREST                                                      C) - BONE MARROW ASPIRATE, LEFT ILIAC CREST                                                FINAL DIAGNOSIS 04/24/2024    Final                    Value:This result contains rich text formatting which cannot be displayed here.    Bone Marrow Differential 04/24/2024    Final                    Value:This result contains rich text formatting which cannot be displayed here.    Microscopic Description 04/24/2024    Final                    Value:This result contains rich text formatting which cannot be displayed here.    Gross Description 04/24/2024    Final                    Value:This result contains rich text formatting which cannot be displayed here.    Case Report 04/24/2024    Final                    Value:Flow Cytometry                                    Case: [id number]                                   Authorizing Provider:  Ramos Wang PA-C       Collected:           04/24/2024 0844              Ordering Location:     [facility]   Received:            04/24/2024 0856                                     [place]                                                                       Pathologist:           Jose David Champion MD                                                        Specimen:    Bone Marrow Aspirate, Moncytosis                                                           Diagnosis 04/24/2024    Final                    Value:This result contains rich text formatting which cannot be displayed here.      04/24/2024    Final                    Value:This result  contains rich text formatting which cannot be displayed here.    Flow Test Ordered 04/24/2024 Acute Panel  not established Final    Specimen Viability 04/24/2024 Acceptable  not established Final    Cell Count 04/24/2024 8.66  not established x10*3/uL Final    Number of Cells Collected 04/24/2024    Final    Methodology 04/24/2024    Final                    Value:This result contains rich text formatting which cannot be displayed here.    WBC 04/23/2024 8.3  4.4 - 11.3 x10*3/uL Final    nRBC 04/23/2024 0.0  0.0 - 0.0 /100 WBCs Final    RBC 04/23/2024 5.63  4.50 - 5.90 x10*6/uL Final    Hemoglobin 04/23/2024 17.4  13.5 - 17.5 g/dL Final    Hematocrit 04/23/2024 50.0  41.0 - 52.0 % Final    MCV 04/23/2024 89  80 - 100 fL Final    MCH 04/23/2024 30.9  26.0 - 34.0 pg Final    MCHC 04/23/2024 34.8  32.0 - 36.0 g/dL Final    RDW 04/23/2024 12.0  11.5 - 14.5 % Final    Platelets 04/23/2024 215  150 - 450 x10*3/uL Final    Immature Granulocytes %, Automated 04/23/2024 0.5  0.0 - 0.9 % Final    Immature Granulocytes Absolute, Au* 04/23/2024 0.04  0.00 - 0.70 x10*3/uL Final    Neutrophils %, Manual 04/23/2024 59.0  40.0 - 80.0 % Final    Lymphocytes %, Manual 04/23/2024 24.0  13.0 - 44.0 % Final    Monocytes %, Manual 04/23/2024 17.0  2.0 - 10.0 % Final    Eosinophils %, Manual 04/23/2024 0.0  0.0 - 6.0 % Final    Basophils %, Manual 04/23/2024 0.0  0.0 - 2.0 % Final    Seg Neutrophils Absolute, Manual 04/23/2024 4.90  1.20 - 7.00 x10*3/uL Final    Lymphocytes Absolute, Manual 04/23/2024 1.99  1.20 - 4.80 x10*3/uL Final    Monocytes Absolute, Manual 04/23/2024 1.41 (H)  0.10 - 1.00 x10*3/uL Final    Eosinophils Absolute, Manual 04/23/2024 0.00  0.00 - 0.70 x10*3/uL Final    Basophils Absolute, Manual 04/23/2024 0.00  0.00 - 0.10 x10*3/uL Final    Total Cells Counted 04/23/2024 100   Final    RBC Morphology 04/23/2024 See Below   Final    Polychromasia 04/23/2024 Mild   Final    ISCN 04/24/2024    Final                     Value:FISH NEGATIVE for MLL (KMT2A) rearrangement    nuc teja(MLLx2)[250]      Cytogenetics Interpretation 04/24/2024    Final                    Value:This result contains rich text formatting which cannot be displayed here.    Electronically signed and reported* 04/24/2024    Final                    Value:This result contains rich text formatting which cannot be displayed here.    Electronically co-signed by 04/24/2024    Final                    Value:This result contains rich text formatting which cannot be displayed here.    ISCN 04/24/2024    Final                    Value:FISH NEGATIVE for FGFR1 rearrangement    nuc teja(WAMG2x1)[250]      Cytogenetics Interpretation 04/24/2024    Final                    Value:This result contains rich text formatting which cannot be displayed here.    Electronically signed and reported* 04/24/2024    Final                    Value:This result contains rich text formatting which cannot be displayed here.    Electronically co-signed by 04/24/2024    Final                    Value:This result contains rich text formatting which cannot be displayed here.    Cytogenetics Interpretation 04/24/2024    Final                    Value:This result contains rich text formatting which cannot be displayed here.    Cells Counted 04/24/2024 7  cells Final    Cells Analyzed 04/24/2024 7  cells Final    Cells Imaged 04/24/2024 4  cells Final    Cells Karyotyped 04/24/2024 2  cells Final    Modal Chromosome # 04/24/2024 46  chromosomes Final    Hypomodal Chromosome # 04/24/2024 0  cells Final    Hypermodal Chromosome # 04/24/2024 0  cells Final    Staining Method 04/24/2024 GTG   Final    Band Resolution 04/24/2024 400  bands Final    Karyotype Specimen Adequacy 04/24/2024 Hypocellular   Final    Electronically signed and reported* 04/24/2024    Final                    Value:This result contains rich text formatting which cannot be displayed here.    [facility]    [facility]                     Value:FISH NEGATIVE for deletion 5q/monosomy 5, deletion 7q/monosomy 7, trisomy 8, deletion 17p/monosomy 17, and deletion 20q      nuc teja (D5S23,EGR1)x2[250],(D7Z1,O7X579)x2[250],(D8Z2x2)[250],(TP53,D17Z1)x2[250],([id number])[250]      Cytogenetics Interpretation 04/24/2024    Final                    Value:This result contains rich text formatting which cannot be displayed here.    Electronically signed and reported* 04/24/2024    Final                    Value:This result contains rich text formatting which cannot be displayed here.    Electronically co-signed by 04/24/2024    Final                    Value:This result contains rich text formatting which cannot be displayed here.    Scan Result 04/24/2024 See Scanned Result   Final         Pathology:  Bone Marrow Biopsy:   FINAL DIAGNOSIS   Date Value Ref Range Status   04/24/2024   Final    A, B & C. BONE MARROW CLOT WITH ASPIRATE AND CORE WITH TOUCH PREP, LEFT ILIAC CREST:     -- NORMOCELLULAR BONE MARROW (40%) WITH MILD MONOCYTOSIS AND NORMAL TRILINEAGE HEMATOPOIESIS, SEE NOTE.    NOTE: There is a peripheral monocytosis which appears persistent based on review of the medical record. Morphologically, no definite dysplasia is noted and monocytes appear mostly normal. Blasts are not increased. By flow cytometry, a reduction in non-classical monocytes is noted without other abnormalities. Recent myeloid NGS from the blood demonstrated two TET2 alterations at variant allele fraction (VAF) of 22%. 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 Of note WHO 2022 classification does not include CMML-0 which the process would have been previously been categorized. Moreover, by ICC 2022 classification, the process is based considered a clonal monocytosis of undetermined  significance since there is no overt evidence of dyspoiesis or increase in blasts in the marrow or blood.  Additional genetic studies are pending. Clinical correlation recommended.         Bone Marrow Differential   Date Value Ref Range Status   04/24/2024   Final    Cell Type Value Reference Range   Promyelocytes 0.5 1-5 %   Myelocytes 20.0 5-10 %   Metamyelocytes 18.0 10-25 %   Bands 5.5 10-20 %   Segmented Neutrophils 3.5 5-30 %   Eosinophils 2.5 2-4 %   Basophils 0.0 0-1 %        Lymphocytes 2.0 5-25 %   Monocytes 8.0 0-2 %   Plasma Cells 1.0 0-2 %        Blasts 0.0 0-1 %        Total Erythroid 39.0 17-35 %        Total Cells Counted 200    Myeloid/Erythroid Ratio 1.3 1.5-4.1          Microscopic Description   Date Value Ref Range Status   04/24/2024   Final    PERIPHERAL SMEAR: Submitted              Red cells: Normal.        White cells: Normal granulocyte morphology with mild monocytosis (17%) with normal morphology       Platelets: Normal, adequate.    ASPIRATE SMEAR: Submitted                      Specimen: Spicular.       Erythropoiesis: Normal.       Granulopoiesis: Normal.       Megakaryocytes: Present. Morphology: Normal.       Comment: mild monocytosis (8%)    TOUCH PREP: Submitted       Specimen: Cellular.        Comments: Similar to aspirate smear.    ASPIRATE CLOT: Submitted                           Specimen: Spicular.       Cellularity: 40%         Estimated M:E ratio: Consistent with aspirate smear.       Megakaryocytes: Adequate. Morphology: Normal.       Granulomas: Absent.       Lymphoid aggregates: Absent.        Comments: Similar to core biopsy.    CORE BIOPSY: Submitted                            Specimen: Adequate.       Cellularity: 40%       Estimated M:E ratio: Consistent with aspirate smear.       Bony trabeculae: Normal.       Megakaryocytes: Adequate. Morphology: Normal.         Granulomas: Absent.       Lymphoid aggregates: Absent.    SPECIAL STAINS:         Iron: Storage iron  "adequate; no ring sideroblasts identified.    IMMUNOHISTOCHEMISTRY: Performed.       MPO: highlights granulocytes roughly 50% of cellularity       Lysozyme: similar to MPO, roughly 50% of cellularity       CD34: no increase in blasts (roughly 1%).    FLOW CYTOMETRY: Performed, see separate report. Atypical partitioning of classical and non-classical monocytes with decrease in non-classical (CD16+CD14 dim) monocytes.    The microscopic findings were reviewed in conjunction with hematopathology resident King Durand MD, PhD.         Gross Description   Date Value Ref Range Status   04/24/2024   Final    A:  Received in formalin, labeled with the patient's name and hospital number and \"C\", is an irregular fragment of blood clot measuring 2.0 x 1.2 x 0.1 cm. The specimen is submitted in toto in one cassette.   AE     B:  Received in B-plus fixative, labeled with the patient's name and hospital number, is a cylindrical segment of bone with attached blood clot measuring 1.7 x 0.3 x 0.3 cm. The specimen is submitted in toto in one cassette following decalcification in [facility] [employer].   AE                  Mundo Garcia MD    "

## 2024-05-20 NOTE — ASSESSMENT & PLAN NOTE
5/20/2024: Long discussion with patient today.  Technically by WHO, we could actually diagnose a malignancy, but I believe he is a good example of why the ICC classification system is often preferred by clinicians.  He has no evidence of significant cytopenias with a mild monocytosis and evidence of a clonal myeloid population with TET2 mutations.  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  I discussed that this is likely best thought of is a premalignant condition, but not 1 that are able to intervene in a proactive manner on with known changes to long-term outcomes.  Therefore the primary recommendation is observation.  I typically recommend every 3 to 6 months, and I reviewed signs and symptoms including new onset splenomegaly symptoms (left upper quadrant pain, early satiety), or unexplained B symptoms, or signs and symptoms of of new onset anemia such as unexplained fatigue, or B symptoms such as fevers, chills, night sweats that do not have an alternative cause.  All of these should warrant an interval visit as well.  Diagnostics:  - none pending  Treatment:  - none indicated   Disease Monitoring:  - every six month hematology follow up (reviewed ELPIDIO help and involvement)

## 2024-05-20 NOTE — LETTER
May 22, 2024     Ramos Wang PA-C  [address] [facility] [address]    Patient: Eric Tinajero   YOB: 1960   Date of Visit: 5/20/2024       Dear Dr. Ramos Wang PA-C:    Thank you for referring Eric Tinajero to me for evaluation. Below are my notes for this consultation. 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     If you have questions, please do not hesitate to call me. I look forward to following your patient along with you.       Sincerely,     Mundo Garcia MD      CC: ANGIE Wiggins  ______________________________________________________________________________________    Patient ID: Eric Tinajero is a 64 y.o. male.  Referring Physician: Ramos Wang PA-C  [id number] [id number]  Primary Care Provider: ANGIE Wiggins    Date of Service:  5/20/2024    ASSESSMENT and PLAN:      Problem List Items Addressed This Visit       CMML (chronic myelomonocytic leukemia) (Multi) - Primary    Current Assessment & Plan     5/20/2024: Long discussion with patient today.  Technically by WHO, we could actually diagnose a malignancy, but I believe he is a good example of why the ICC classification system is often preferred by commissions.  He has no evidence of significant cytopenias with a mild monocytosis and evidence of a clonal myeloid population with TET2 mutations.  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  I discussed that this is likely best thought of is a premalignant condition, but not 1 that are able to intervene in a proactive manner on with known changes to long-term outcomes.  Therefore the primary recommendation is observation.  I typically recommend every 3 to 6 months, and I reviewed signs and symptoms including new onset splenomegaly symptoms (left upper quadrant pain, early satiety), or unexplained B  symptoms, or signs and symptoms of of new onset anemia such as unexplained fatigue, or B symptoms such as fevers, chills, night sweats that do not have an alternative cause.  All of these should warrant an interval visit as well.  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  Initial evaluation noted monocytosis since 2/5/2018, ranging 1.08-1.83 and workup identified monocytic atypia.  Was arranged for bone marrow biopsy on 4/24/2024, which demonstrated: -- NORMOCELLULAR BONE MARROW (40%) WITH MILD MONOCYTOSIS AND NORMAL TRILINEAGE HEMATOPOIESIS, SEE NOTE.   NOTE: There is a peripheral monocytosis which appears persistent based on review of the medical record. Morphologically, no definite dysplasia is noted and monocytes appear mostly normal. Blasts are not increased. By flow cytometry, a reduction in non-classical monocytes is noted without other abnormalities. Recent myeloid NGS from the blood demonstrated two TET2 alterations at variant allele fraction (VAF) of 22%. 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 Of note WHO 2022 classification does not include CMML-0 which the process would have been  previously been categorized. Moreover, by ICC 2022 classification, the process is based considered a clonal monocytosis of undetermined significance since there is no overt evidence of dyspoiesis or increase in blasts in the marrow or blood.  Additional genetic studies are pending. Clinical correlation recommended.  Molecular: TET2 mutations x2 (max VAF 22%)  Karyotype: 4/17 PB FISH for BCR-ABL negative. FISH negative for deletion 5q/monosomy 5, deletion 7q/monosomy 7, trisomy 8, deletion 17p/monosomy 17, and deletion 20q.  Break apart FISH negative for FGFR1 rearrangement and KMT2A rearrangements.  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 Has lots of knowledge - he noted that his PCP has found that it was only slightly elevated, and noted that it was continuing to rise.  Seems like it's only gone upward over time.                Past Medical History:  Peripheral neuropathy - idiopathic, that is advancing - unfortunately, not treatment  Hepatic steatosis - sees hepatology  Hypertension - on lisinopril  Osteoarthritis - on glucosamine  Diabetes: last A1c in the 7s per his memory - on oral medications right now.           Past Surgical History:  History of four knee surgeries; no replacement  Repair of hernia     Family History:   Father: abdomlinal aneurysm  Mother: aortic aneurysm, diabetic  Grandfather, paternal: stroke  20 y/o son had a CVA         Social History:  Cytology supervisor in cytopathology  Lives with spouse  Three grown sons       Review of Systems   Constitutional:  Negative for activity change, appetite change, chills, diaphoresis, fatigue, fever and unexpected weight change.   HENT:  Negative for  congestion, mouth sores, nosebleeds, rhinorrhea, sinus pressure, sinus pain and sore throat.    Eyes:  Negative for visual disturbance.   Respiratory:  Negative for cough and shortness of breath.    Cardiovascular:  Negative for chest pain and leg swelling.   Gastrointestinal:  Negative for abdominal pain, constipation, diarrhea, nausea and vomiting.   Genitourinary:  Negative for difficulty urinating and flank pain.   Musculoskeletal:  Negative for back pain and joint swelling.        Admits to chronic joint pain   Skin:  Negative for rash.   Neurological:  Negative for weakness, light-headedness, numbness and headaches.   Hematological:  Negative for adenopathy. Does not bruise/bleed easily.   Psychiatric/Behavioral:  Negative for confusion and dysphoric mood. The patient is not nervous/anxious.        Home Medications and Adherence Reviewed with Patient.       Objective     VS:  /81 (BP Location: Left arm, Patient Position: Sitting, BP Cuff Size: Large adult)   Pulse 90   Temp 36 °C (96.8 °F) (Skin)   Resp 16   Wt 134 kg (295 lb 4.8 oz)   SpO2 96%   BMI 35.74 kg/m²   BSA: 2.68 meters squared    Physical Exam  Constitutional:       Appearance: Normal appearance.   HENT:      Mouth/Throat:      Mouth: Mucous membranes are moist.   Eyes:      Conjunctiva/sclera: Conjunctivae normal.      Pupils: Pupils are equal, round, and reactive to light.   Abdominal:      General: Abdomen is flat.      Palpations: Abdomen is soft. There is no splenomegaly.   Musculoskeletal:         General: Normal range of motion.   Lymphadenopathy:      Head:      Right side of head: No submental, submandibular, tonsillar, preauricular, posterior auricular or occipital adenopathy.      Left side of head: No submental, submandibular, tonsillar, preauricular, posterior auricular or occipital adenopathy.      Cervical: No cervical adenopathy.      Right cervical: No superficial cervical adenopathy.     Left cervical: No superficial  cervical adenopathy.   Skin:     General: Skin is warm and dry.   Neurological:      General: No focal deficit present.      Mental Status: He is oriented to person, place, and time.   Psychiatric:         Mood and Affect: Mood normal.         Behavior: Behavior normal.         Laboratory:  Orders Only on 04/22/2024   Component Date Value Ref Range Status   • Case Report 04/24/2024    Final                    Value:Surgical Pathology                                Case: [id number]                                  Authorizing Provider:  Ramos Wang PA-C       Collected:           04/24/2024 0844              Ordering Location:     [facility]   Received:            04/24/2024 0856                                     Center                                                                       Pathologist:           Jose David Champion MD                                                        Specimens:   A) - BONE MARROW CLOT, LEFT ILIAC CREST                                                             B) - BONE MARROW CORE BIOPSY, LEFT ILIAC CREST                                                      C) - BONE MARROW ASPIRATE, LEFT ILIAC CREST                                               • FINAL DIAGNOSIS 04/24/2024    Final                    Value:This result contains rich text formatting which cannot be displayed here.   • Bone Marrow Differential 04/24/2024    Final                    Value:This result contains rich text formatting which cannot be displayed here.   • Microscopic Description 04/24/2024    Final                    Value:This result contains rich text formatting which cannot be displayed here.   • Gross Description 04/24/2024    Final                    Value:This result contains rich text formatting which cannot be displayed here.   • Case Report 04/24/2024    Final                    Value:Flow Cytometry                                    Case: [id number]                                    Authorizing Provider:  Ramos Wang PA-C       Collected:           04/24/2024 0844              Ordering Location:     [facility]   Received:            04/24/2024 0856                                     Center                                                                       Pathologist:           Jose David Champion MD                                                        Specimen:    Bone Marrow Aspirate, Moncytosis                                                          • Diagnosis 04/24/2024    Final                    Value:This result contains rich text formatting which cannot be displayed here.   •   04/24/2024    Final                    Value:This result contains rich text formatting which cannot be displayed here.   • Flow Test Ordered 04/24/2024 Acute Panel  not established Final   • Specimen Viability 04/24/2024 Acceptable  not established Final   • Cell Count 04/24/2024 8.66  not established x10*3/uL Final   • Number of Cells Collected 04/24/2024    Final   • Methodology 04/24/2024    Final                    Value:This result contains rich text formatting which cannot be displayed here.   • WBC 04/23/2024 8.3  4.4 - 11.3 x10*3/uL Final   • nRBC 04/23/2024 0.0  0.0 - 0.0 /100 WBCs Final   • RBC 04/23/2024 5.63  4.50 - 5.90 x10*6/uL Final   • Hemoglobin 04/23/2024 17.4  13.5 - 17.5 g/dL Final   • Hematocrit 04/23/2024 50.0  41.0 - 52.0 % Final   • MCV 04/23/2024 89  80 - 100 fL Final   • MCH 04/23/2024 30.9  26.0 - 34.0 pg Final   • MCHC 04/23/2024 34.8  32.0 - 36.0 g/dL Final   • RDW 04/23/2024 12.0  11.5 - 14.5 % Final   • Platelets 04/23/2024 215  150 - 450 x10*3/uL Final   • Immature Granulocytes %, Automated 04/23/2024 0.5  0.0 - 0.9 % Final   • Immature Granulocytes Absolute, Au* 04/23/2024 0.04  0.00 - 0.70 x10*3/uL Final   • Neutrophils %, Manual 04/23/2024 59.0  40.0 - 80.0 % Final   • Lymphocytes %, Manual 04/23/2024 24.0  13.0 - 44.0 % Final   • Monocytes %,  Manual 04/23/2024 17.0  2.0 - 10.0 % Final   • Eosinophils %, Manual 04/23/2024 0.0  0.0 - 6.0 % Final   • Basophils %, Manual 04/23/2024 0.0  0.0 - 2.0 % Final   • Seg Neutrophils Absolute, Manual 04/23/2024 4.90  1.20 - 7.00 x10*3/uL Final   • Lymphocytes Absolute, Manual 04/23/2024 1.99  1.20 - 4.80 x10*3/uL Final   • Monocytes Absolute, Manual 04/23/2024 1.41 (H)  0.10 - 1.00 x10*3/uL Final   • Eosinophils Absolute, Manual 04/23/2024 0.00  0.00 - 0.70 x10*3/uL Final   • Basophils Absolute, Manual 04/23/2024 0.00  0.00 - 0.10 x10*3/uL Final   • Total Cells Counted 04/23/2024 100   Final   • RBC Morphology 04/23/2024 See Below   Final   • Polychromasia 04/23/2024 Mild   Final   • ISCN 04/24/2024    Final                    Value:FISH NEGATIVE for MLL (KMT2A) rearrangement    nuc teja(MLLx2)[250]     • Cytogenetics Interpretation 04/24/2024    Final                    Value:This result contains rich text formatting which cannot be displayed here.   • Electronically signed and reported* 04/24/2024    Final                    Value:This result contains rich text formatting which cannot be displayed here.   • Electronically co-signed by 04/24/2024    Final                    Value:This result contains rich text formatting which cannot be displayed here.   • ISCN 04/24/2024    Final                    Value:FISH NEGATIVE for FGFR1 rearrangement    nuc teja([id number])[250]     • Cytogenetics Interpretation 04/24/2024    Final                    Value:This result contains rich text formatting which cannot be displayed here.   • Electronically signed and reported* 04/24/2024    Final                    Value:This result contains rich text formatting which cannot be displayed here.   • Electronically co-signed by 04/24/2024    Final                    Value:This result contains rich text formatting which cannot be displayed here.   • Cytogenetics Interpretation 04/24/2024    Final                    Value:This result contains  rich text formatting which cannot be displayed here.   • Cells Counted 04/24/2024 7  cells Final   • Cells Analyzed 04/24/2024 7  cells Final   • Cells Imaged 04/24/2024 4  cells Final   • Cells Karyotyped 04/24/2024 2  cells Final   • Modal Chromosome # 04/24/2024 46  chromosomes Final   • Hypomodal Chromosome # 04/24/2024 0  cells Final   • Hypermodal Chromosome # 04/24/2024 0  cells Final   • Staining Method 04/24/2024 GTG   Final   • Band Resolution 04/24/2024 400  bands Final   • Karyotype Specimen Adequacy 04/24/2024 Hypocellular   Final   • Electronically signed and reported* 04/24/2024    Final                    Value:This result contains rich text formatting which cannot be displayed here.   • ISCN 04/24/2024    Final                    Value:FISH NEGATIVE for deletion 5q/monosomy 5, deletion 7q/monosomy 7, trisomy 8, deletion 17p/monosomy 17, and deletion 20q      nuc teja (D5S23,EGR1)x2[250],(D7Z1,L8D171)x2[250],(D8Z2x2)[250],(TP53,D17Z1)x2[250],([id number])[250]     • Cytogenetics Interpretation 04/24/2024    Final                    Value:This result contains rich text formatting which cannot be displayed here.   • Electronically signed and reported* 04/24/2024    Final                    Value:This result contains rich text formatting which cannot be displayed here.   • Electronically co-signed by 04/24/2024    Final                    Value:This result contains rich text formatting which cannot be displayed here.   • Scan Result 04/24/2024 See Scanned Result   Final         Pathology:  Bone Marrow Biopsy:   FINAL DIAGNOSIS   Date Value Ref Range Status   04/24/2024   Final    A, B & C. BONE MARROW CLOT WITH ASPIRATE AND CORE WITH TOUCH PREP, LEFT ILIAC CREST:     -- NORMOCELLULAR BONE MARROW (40%) WITH MILD MONOCYTOSIS AND NORMAL TRILINEAGE HEMATOPOIESIS, SEE NOTE.    NOTE: There is a peripheral monocytosis which appears persistent based on review of the medical record. Morphologically, no definite  dysplasia is noted and monocytes appear mostly normal. Blasts are not increased. By flow cytometry, a reduction in non-classical monocytes is noted without other abnormalities. Recent myeloid NGS from the blood demonstrated two TET2 alterations at variant allele fraction (VAF) of 22%. 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 Of note WHO 2022 classification does not include CMML-0 which the process would have been previously been categorized. Moreover, by ICC 2022 classification, the process is based considered a clonal monocytosis of undetermined significance since there is no overt evidence of dyspoiesis or increase in blasts in the marrow or blood.  Additional genetic studies are pending. Clinical correlation recommended.         Bone Marrow Differential   Date Value Ref Range Status   04/24/2024   Final    Cell Type Value Reference Range   Promyelocytes 0.5 1-5 %   Myelocytes 20.0 5-10 %   Metamyelocytes 18.0 10-25 %   Bands 5.5 10-20 %   Segmented Neutrophils 3.5 5-30 %   Eosinophils 2.5 2-4 %   Basophils 0.0 0-1 %        Lymphocytes 2.0 5-25 %   Monocytes 8.0 0-2 %   Plasma Cells 1.0 0-2 %        Blasts 0.0 0-1 %        Total Erythroid 39.0 17-35 %        Total Cells Counted 200    Myeloid/Erythroid Ratio 1.3 1.5-4.1          Microscopic Description   Date Value Ref Range Status   04/24/2024   Final    PERIPHERAL SMEAR: Submitted              Red cells: Normal.        White cells: Normal granulocyte morphology with mild monocytosis (17%) with normal morphology       Platelets: Normal, adequate.    ASPIRATE SMEAR: Submitted                      Specimen: Spicular.       Erythropoiesis: Normal.       Granulopoiesis: Normal.       Megakaryocytes: Present. Morphology: Normal.       Comment: mild monocytosis  "(8%)    TOUCH PREP: Submitted       Specimen: Cellular.        Comments: Similar to aspirate smear.    ASPIRATE CLOT: Submitted                           Specimen: Spicular.       Cellularity: 40%         Estimated M:E ratio: Consistent with aspirate smear.       Megakaryocytes: Adequate. Morphology: Normal.       Granulomas: Absent.       Lymphoid aggregates: Absent.        Comments: Similar to core biopsy.    CORE BIOPSY: Submitted                            Specimen: Adequate.       Cellularity: 40%       Estimated M:E ratio: Consistent with aspirate smear.       Bony trabeculae: Normal.       Megakaryocytes: Adequate. Morphology: Normal.         Granulomas: Absent.       Lymphoid aggregates: Absent.    SPECIAL STAINS:         Iron: Storage iron adequate; no ring sideroblasts identified.    IMMUNOHISTOCHEMISTRY: Performed.       MPO: highlights granulocytes roughly 50% of cellularity       Lysozyme: similar to MPO, roughly 50% of cellularity       CD34: no increase in blasts (roughly 1%).    FLOW CYTOMETRY: Performed, see separate report. Atypical partitioning of classical and non-classical monocytes with decrease in non-classical (CD16+CD14 dim) monocytes.    The microscopic findings were reviewed in conjunction with hematopathology resident King Durand MD, PhD.         Gross Description   Date Value Ref Range Status   04/24/2024   Final    A:  Received in formalin, labeled with the patient's name and hospital number and \"C\", is an irregular fragment of blood clot measuring 2.0 x 1.2 x 0.1 cm. The specimen is submitted in toto in one cassette.   AE     B:  Received in B-plus fixative, labeled with the patient's name and hospital number, is a cylindrical segment of bone with attached blood clot measuring 1.7 x 0.3 x 0.3 cm. The specimen is submitted in toto in one cassette following decalcification in [facility] [employer] [employer].   AE                  Mundo Garcia MD    "

## 2024-05-20 NOTE — Clinical Note
May 22, 2024      TO: ANGIE Wiggins APRN-CNP  [address]  [facility] [address]       Regarding:   Patient:  :  Visit Date: Eric Tinajero  1960  2024       Dear ANGIE Jane     I saw our mutual patient Eric Tinajero for an interval visit in the malignant hematology clinic at [facility].  Please see below for my notes from this encounter. Please do not hesitate to call me if you have any questions. I look forward to continuing to follow your patient along with you.      Sincerely,    Mundo Garcia MD         CC: ANGIE Wiggins  [address] [facility] [address]  Via In Basket     Ramos Wang PA-C  [address] [facility] [address]  Via In Basket    ANGIE Wiggins  [address]  [facility] [address]       Please see my documentation below:   Patient ID: Eric Tinajero is a 64 y.o. male.  Referring Physician: Ramos Wang PA-C  [address] [place] [address]  Primary Care Provider: ANGIE Wiggins    Date of Service:  2024    ASSESSMENT and PLAN:      Problem List Items Addressed This Visit       CMML (chronic myelomonocytic leukemia) (Multi) - Primary    Current Assessment & Plan     2024: Long discussion with patient today.  Technically by WHO, we could actually diagnose a malignancy, but I believe he is a good example of why the ICC classification system is often preferred by commissions.  He has no evidence of significant cytopenias with a mild monocytosis and evidence of a clonal myeloid population with TET2 mutations.  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  I discussed that this is likely best thought of is a premalignant condition, but not 1 that are able to intervene in a proactive manner on with known changes to long-term outcomes.  Therefore the primary recommendation is observation.  I typically recommend every 3 to 6 months, and I  reviewed signs and symptoms including new onset splenomegaly symptoms (left upper quadrant pain, early satiety), or unexplained B symptoms, or signs and symptoms of of new onset anemia such as unexplained fatigue, or B symptoms such as fevers, chills, night sweats that do not have an alternative cause.  All of these should warrant an interval visit as well.  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  Initial evaluation noted monocytosis since 2/5/2018, ranging 1.08-1.83 and workup identified monocytic atypia.  Was arranged for bone marrow biopsy on 4/24/2024, which demonstrated: -- NORMOCELLULAR BONE MARROW (40%) WITH MILD MONOCYTOSIS AND NORMAL TRILINEAGE HEMATOPOIESIS, SEE NOTE.   NOTE: There is a peripheral monocytosis which appears persistent based on review of the medical record. Morphologically, no definite dysplasia is noted and monocytes appear mostly normal. Blasts are not increased. By flow cytometry, a reduction in non-classical monocytes is noted without other abnormalities. Recent myeloid NGS from the blood demonstrated two TET2 alterations at variant allele fraction (VAF) of 22%. The findings are most consistent with chronic myelomonocytic leukemia-1 (WHO 2022 classification) based on the presence of persistent monocytosis with evidence of a somatic mutation and flow cytometry  showing abnormal monocyte partitioning. Of note WHO 2022 classification does not include CMML-0 which the process would have been previously been categorized. Moreover, by ICC 2022 classification, the process is based considered a clonal monocytosis of undetermined significance since there is no overt evidence of dyspoiesis or increase in blasts in the marrow or blood.  Additional genetic studies are pending. Clinical correlation recommended.  Molecular: TET2 mutations x2 (max VAF 22%)  Karyotype: 4/17 PB FISH for BCR-ABL negative. FISH negative for deletion 5q/monosomy 5, deletion 7q/monosomy 7, trisomy 8, deletion 17p/monosomy 17, and deletion 20q.  Break apart FISH negative for FGFR1 rearrangement and KMT2A rearrangements.  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 Has lots of knowledge - he noted that his PCP has found that it was only slightly elevated, and noted that it was continuing to rise.  Seems like it's only gone upward over time.                Past Medical History:  Peripheral neuropathy - idiopathic, that is advancing - unfortunately, not treatment  Hepatic steatosis - sees hepatology  Hypertension - on lisinopril  Osteoarthritis - on glucosamine  Diabetes: last A1c in the 7s per his memory - on oral medications right now.           Past Surgical History:  History of four knee surgeries; no replacement  Repair of hernia     Family History:   Father: abdomlinal aneurysm  Mother: aortic aneurysm, diabetic  Grandfather, paternal: stroke  20 y/o son had a CVA         Social History:  Cytology supervisor in cytopathology  Lives with spouse  Three grown sons       Review of Systems   Constitutional:  Negative for  activity change, appetite change, chills, diaphoresis, fatigue, fever and unexpected weight change.   HENT:  Negative for congestion, mouth sores, nosebleeds, rhinorrhea, sinus pressure, sinus pain and sore throat.    Eyes:  Negative for visual disturbance.   Respiratory:  Negative for cough and shortness of breath.    Cardiovascular:  Negative for chest pain and leg swelling.   Gastrointestinal:  Negative for abdominal pain, constipation, diarrhea, nausea and vomiting.   Genitourinary:  Negative for difficulty urinating and flank pain.   Musculoskeletal:  Negative for back pain and joint swelling.        Admits to chronic joint pain   Skin:  Negative for rash.   Neurological:  Negative for weakness, light-headedness, numbness and headaches.   Hematological:  Negative for adenopathy. Does not bruise/bleed easily.   Psychiatric/Behavioral:  Negative for confusion and dysphoric mood. The patient is not nervous/anxious.        Home Medications and Adherence Reviewed with Patient.       Objective     VS:  /81 (BP Location: Left arm, Patient Position: Sitting, BP Cuff Size: Large adult)   Pulse 90   Temp 36 °C (96.8 °F) (Skin)   Resp 16   Wt 134 kg (295 lb 4.8 oz)   SpO2 96%   BMI 35.74 kg/m²   BSA: 2.68 meters squared    Physical Exam  Constitutional:       Appearance: Normal appearance.   HENT:      Mouth/Throat:      Mouth: Mucous membranes are moist.   Eyes:      Conjunctiva/sclera: Conjunctivae normal.      Pupils: Pupils are equal, round, and reactive to light.   Abdominal:      General: Abdomen is flat.      Palpations: Abdomen is soft. There is no splenomegaly.   Musculoskeletal:         General: Normal range of motion.   Lymphadenopathy:      Head:      Right side of head: No submental, submandibular, tonsillar, preauricular, posterior auricular or occipital adenopathy.      Left side of head: No submental, submandibular, tonsillar, preauricular, posterior auricular or occipital adenopathy.       Cervical: No cervical adenopathy.      Right cervical: No superficial cervical adenopathy.     Left cervical: No superficial cervical adenopathy.   Skin:     General: Skin is warm and dry.   Neurological:      General: No focal deficit present.      Mental Status: He is oriented to person, place, and time.   Psychiatric:         Mood and Affect: Mood normal.         Behavior: Behavior normal.         Laboratory:  Orders Only on 04/22/2024   Component Date Value Ref Range Status    Case Report 04/24/2024    Final                    Value:Surgical Pathology                                Case: [id number]                                  Authorizing Provider:  Ramos Wang PA-C       Collected:           04/24/2024 0844              Ordering Location:     [facility]   Received:            04/24/2024 0856                                     Center                                                                       Pathologist:           Jose David Champion MD                                                        Specimens:   A) - BONE MARROW CLOT, LEFT ILIAC CREST                                                             B) - BONE MARROW CORE BIOPSY, LEFT ILIAC CREST                                                      C) - BONE MARROW ASPIRATE, LEFT ILIAC CREST                                                FINAL DIAGNOSIS 04/24/2024    Final                    Value:This result contains rich text formatting which cannot be displayed here.    Bone Marrow Differential 04/24/2024    Final                    Value:This result contains rich text formatting which cannot be displayed here.    Microscopic Description 04/24/2024    Final                    Value:This result contains rich text formatting which cannot be displayed here.    Gross Description 04/24/2024    Final                    Value:This result contains rich text formatting which cannot be displayed here.    Case Report 04/24/2024    Final                     Value:Flow Cytometry                                    Case: [id number]                                   Authorizing Provider:  Ramos Wang PA-C       Collected:           04/24/2024 0844              Ordering Location:     [facility]   Received:            04/24/2024 0856                                     Center                                                                       Pathologist:           Jose David Champion MD                                                        Specimen:    Bone Marrow Aspirate, Moncytosis                                                           Diagnosis 04/24/2024    Final                    Value:This result contains rich text formatting which cannot be displayed here.      04/24/2024    Final                    Value:This result contains rich text formatting which cannot be displayed here.    Flow Test Ordered 04/24/2024 Acute Panel  not established Final    Specimen Viability 04/24/2024 Acceptable  not established Final    Cell Count 04/24/2024 8.66  not established x10*3/uL Final    Number of Cells Collected 04/24/2024    Final    Methodology 04/24/2024    Final                    Value:This result contains rich text formatting which cannot be displayed here.    WBC 04/23/2024 8.3  4.4 - 11.3 x10*3/uL Final    nRBC 04/23/2024 0.0  0.0 - 0.0 /100 WBCs Final    RBC 04/23/2024 5.63  4.50 - 5.90 x10*6/uL Final    Hemoglobin 04/23/2024 17.4  13.5 - 17.5 g/dL Final    Hematocrit 04/23/2024 50.0  41.0 - 52.0 % Final    MCV 04/23/2024 89  80 - 100 fL Final    MCH 04/23/2024 30.9  26.0 - 34.0 pg Final    MCHC 04/23/2024 34.8  32.0 - 36.0 g/dL Final    RDW 04/23/2024 12.0  11.5 - 14.5 % Final    Platelets 04/23/2024 215  150 - 450 x10*3/uL Final    Immature Granulocytes %, Automated 04/23/2024 0.5  0.0 - 0.9 % Final    Immature Granulocytes Absolute, Au* 04/23/2024 0.04  0.00 - 0.70 x10*3/uL Final    Neutrophils %, Manual 04/23/2024 59.0  40.0  - 80.0 % Final    Lymphocytes %, Manual 04/23/2024 24.0  13.0 - 44.0 % Final    Monocytes %, Manual 04/23/2024 17.0  2.0 - 10.0 % Final    Eosinophils %, Manual 04/23/2024 0.0  0.0 - 6.0 % Final    Basophils %, Manual 04/23/2024 0.0  0.0 - 2.0 % Final    Seg Neutrophils Absolute, Manual 04/23/2024 4.90  1.20 - 7.00 x10*3/uL Final    Lymphocytes Absolute, Manual 04/23/2024 1.99  1.20 - 4.80 x10*3/uL Final    Monocytes Absolute, Manual 04/23/2024 1.41 (H)  0.10 - 1.00 x10*3/uL Final    Eosinophils Absolute, Manual 04/23/2024 0.00  0.00 - 0.70 x10*3/uL Final    Basophils Absolute, Manual 04/23/2024 0.00  0.00 - 0.10 x10*3/uL Final    Total Cells Counted 04/23/2024 100   Final    RBC Morphology 04/23/2024 See Below   Final    Polychromasia 04/23/2024 Mild   Final    ISCN 04/24/2024    Final                    Value:FISH NEGATIVE for MLL (KMT2A) rearrangement    nuc teja(MLLx2)[250]      Cytogenetics Interpretation 04/24/2024    Final                    Value:This result contains rich text formatting which cannot be displayed here.    Electronically signed and reported* 04/24/2024    Final                    Value:This result contains rich text formatting which cannot be displayed here.    Electronically co-signed by 04/24/2024    Final                    Value:This result contains rich text formatting which cannot be displayed here.    ISCN 04/24/2024    Final                    Value:FISH NEGATIVE for FGFR1 rearrangement    nuc teja([id number])[250]      Cytogenetics Interpretation 04/24/2024    Final                    Value:This result contains rich text formatting which cannot be displayed here.    Electronically signed and reported* 04/24/2024    Final                    Value:This result contains rich text formatting which cannot be displayed here.    Electronically co-signed by 04/24/2024    Final                    Value:This result contains rich text formatting which cannot be displayed here.    Cytogenetics  Interpretation 04/24/2024    Final                    Value:This result contains rich text formatting which cannot be displayed here.    Cells Counted 04/24/2024 7  cells Final    Cells Analyzed 04/24/2024 7  cells Final    Cells Imaged 04/24/2024 4  cells Final    Cells Karyotyped 04/24/2024 2  cells Final    Modal Chromosome # 04/24/2024 46  chromosomes Final    Hypomodal Chromosome # 04/24/2024 0  cells Final    Hypermodal Chromosome # 04/24/2024 0  cells Final    Staining Method 04/24/2024 GTG   Final    Band Resolution 04/24/2024 400  bands Final    Karyotype Specimen Adequacy 04/24/2024 Hypocellular   Final    Electronically signed and reported* 04/24/2024    Final                    Value:This result contains rich text formatting which cannot be displayed here.    ISCN 04/24/2024    Final                    Value:FISH NEGATIVE for deletion 5q/monosomy 5, deletion 7q/monosomy 7, trisomy 8, deletion 17p/monosomy 17, and deletion 20q      nuc teja (D5S23,EGR1)x2[250],(D7Z1,W2Y427)x2[250],(D8Z2x2)[250],(TP53,D17Z1)x2[250],([id number])[250]      Cytogenetics Interpretation 04/24/2024    Final                    Value:This result contains rich text formatting which cannot be displayed here.    Electronically signed and reported* 04/24/2024    Final                    Value:This result contains rich text formatting which cannot be displayed here.    Electronically co-signed by 04/24/2024    Final                    Value:This result contains rich text formatting which cannot be displayed here.    Scan Result 04/24/2024 See Scanned Result   Final         Pathology:  Bone Marrow Biopsy:   FINAL DIAGNOSIS   Date Value Ref Range Status   04/24/2024   Final    A, B & C. BONE MARROW CLOT WITH ASPIRATE AND CORE WITH TOUCH PREP, LEFT ILIAC CREST:     -- NORMOCELLULAR BONE MARROW (40%) WITH MILD MONOCYTOSIS AND NORMAL TRILINEAGE HEMATOPOIESIS, SEE NOTE.    NOTE: There is a peripheral monocytosis which appears persistent based  on review of the medical record. Morphologically, no definite dysplasia is noted and monocytes appear mostly normal. Blasts are not increased. By flow cytometry, a reduction in non-classical monocytes is noted without other abnormalities. Recent myeloid NGS from the blood demonstrated two TET2 alterations at variant allele fraction (VAF) of 22%. 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 Of note WHO 2022 classification does not include CMML-0 which the process would have been previously been categorized. Moreover, by ICC 2022 classification, the process is based considered a clonal monocytosis of undetermined significance since there is no overt evidence of dyspoiesis or increase in blasts in the marrow or blood.  Additional genetic studies are pending. Clinical correlation recommended.         Bone Marrow Differential   Date Value Ref Range Status   04/24/2024   Final    Cell Type Value Reference Range   Promyelocytes 0.5 1-5 %   Myelocytes 20.0 5-10 %   Metamyelocytes 18.0 10-25 %   Bands 5.5 10-20 %   Segmented Neutrophils 3.5 5-30 %   Eosinophils 2.5 2-4 %   Basophils 0.0 0-1 %        Lymphocytes 2.0 5-25 %   Monocytes 8.0 0-2 %   Plasma Cells 1.0 0-2 %        Blasts 0.0 0-1 %        Total Erythroid 39.0 17-35 %        Total Cells Counted 200    Myeloid/Erythroid Ratio 1.3 1.5-4.1          Microscopic Description   Date Value Ref Range Status   04/24/2024   Final    PERIPHERAL SMEAR: Submitted              Red cells: Normal.        White cells: Normal granulocyte morphology with mild monocytosis (17%) with normal morphology       Platelets: Normal, adequate.    ASPIRATE SMEAR: Submitted                      Specimen: Spicular.       Erythropoiesis: Normal.       Granulopoiesis: Normal.       Megakaryocytes: Present.  "Morphology: Normal.       Comment: mild monocytosis (8%)    TOUCH PREP: Submitted       Specimen: Cellular.        Comments: Similar to aspirate smear.    ASPIRATE CLOT: Submitted                           Specimen: Spicular.       Cellularity: 40%         Estimated M:E ratio: Consistent with aspirate smear.       Megakaryocytes: Adequate. Morphology: Normal.       Granulomas: Absent.       Lymphoid aggregates: Absent.        Comments: Similar to core biopsy.    CORE BIOPSY: Submitted                            Specimen: Adequate.       Cellularity: 40%       Estimated M:E ratio: Consistent with aspirate smear.       Bony trabeculae: Normal.       Megakaryocytes: Adequate. Morphology: Normal.         Granulomas: Absent.       Lymphoid aggregates: Absent.    SPECIAL STAINS:         Iron: Storage iron adequate; no ring sideroblasts identified.    IMMUNOHISTOCHEMISTRY: Performed.       MPO: highlights granulocytes roughly 50% of cellularity       Lysozyme: similar to MPO, roughly 50% of cellularity       CD34: no increase in blasts (roughly 1%).    FLOW CYTOMETRY: Performed, see separate report. Atypical partitioning of classical and non-classical monocytes with decrease in non-classical (CD16+CD14 dim) monocytes.    The microscopic findings were reviewed in conjunction with hematopathology resident King Durand MD, PhD.         Gross Description   Date Value Ref Range Status   04/24/2024   Final    A:  Received in formalin, labeled with the patient's name and hospital number and \"C\", is an irregular fragment of blood clot measuring 2.0 x 1.2 x 0.1 cm. The specimen is submitted in toto in one cassette.   AE     B:  Received in B-plus fixative, labeled with the patient's name and hospital number, is a cylindrical segment of bone with attached blood clot measuring 1.7 x 0.3 x 0.3 cm. The specimen is submitted in toto in one cassette following decalcification in [facility] [employer].   AE                  Mundo Garcia, " MD

## 2024-05-21 PROCEDURE — 81229 CYTOG ALYS CHRML ABNR SNPCGH: CPT | Performed by: PHYSICIAN ASSISTANT

## 2024-05-21 ASSESSMENT — ENCOUNTER SYMPTOMS
LIGHT-HEADEDNESS: 0
FATIGUE: 0
BACK PAIN: 0
FEVER: 0
DYSPHORIC MOOD: 0
SORE THROAT: 0
WEAKNESS: 0
DIFFICULTY URINATING: 0
SINUS PAIN: 0
SHORTNESS OF BREATH: 0
DIARRHEA: 0
BRUISES/BLEEDS EASILY: 0
ADENOPATHY: 0
APPETITE CHANGE: 0
CONFUSION: 0
CONSTIPATION: 0
UNEXPECTED WEIGHT CHANGE: 0
DIAPHORESIS: 0
VOMITING: 0
SINUS PRESSURE: 0
ACTIVITY CHANGE: 0
HEADACHES: 0
JOINT SWELLING: 0
NAUSEA: 0
COUGH: 0
ABDOMINAL PAIN: 0
NUMBNESS: 0
NERVOUS/ANXIOUS: 0
RHINORRHEA: 0
FLANK PAIN: 0
CHILLS: 0

## 2024-05-23 ENCOUNTER — DOCUMENTATION (OUTPATIENT)
Dept: HEMATOLOGY/ONCOLOGY | Facility: HOSPITAL | Age: 64
End: 2024-05-23
Payer: COMMERCIAL

## 2024-05-24 ENCOUNTER — TELEPHONE (OUTPATIENT)
Dept: PRIMARY CARE | Facility: CLINIC | Age: 64
End: 2024-05-24
Payer: COMMERCIAL

## 2024-05-24 DIAGNOSIS — E11.9 TYPE 2 DIABETES MELLITUS WITHOUT COMPLICATION, WITHOUT LONG-TERM CURRENT USE OF INSULIN (MULTI): ICD-10-CM

## 2024-05-24 RX ORDER — METFORMIN HYDROCHLORIDE 500 MG/1
500 TABLET ORAL
Qty: 60 TABLET | Refills: 0 | Status: CANCELLED | OUTPATIENT
Start: 2024-05-24

## 2024-05-24 RX ORDER — METFORMIN HYDROCHLORIDE 500 MG/1
500 TABLET ORAL
Qty: 60 TABLET | Refills: 0 | Status: SHIPPED | OUTPATIENT
Start: 2024-05-24 | End: 2024-05-31 | Stop reason: SDUPTHER

## 2024-05-24 ASSESSMENT — ENCOUNTER SYMPTOMS
CONFUSION: 0
BLURRED VISION: 0
HUNGER: 0
POLYPHAGIA: 0
VISUAL CHANGE: 0
SEIZURES: 0
TREMORS: 0
SWEATS: 0
NERVOUS/ANXIOUS: 0
WEIGHT LOSS: 0
BLACKOUTS: 0
FATIGUE: 0
WEAKNESS: 0
HEADACHES: 0
POLYDIPSIA: 0
DIZZINESS: 0
SPEECH DIFFICULTY: 0

## 2024-05-24 NOTE — TELEPHONE ENCOUNTER
Eric scheduled an appointment for next Friday.  Can you please send in his Metformin into the pharmacy?   Please check the directions of the Metformin, is he supposed to take it once or twice a day? Thank you

## 2024-05-31 ENCOUNTER — OFFICE VISIT (OUTPATIENT)
Dept: PRIMARY CARE | Facility: CLINIC | Age: 64
End: 2024-05-31
Payer: COMMERCIAL

## 2024-05-31 VITALS
WEIGHT: 298.4 LBS | DIASTOLIC BLOOD PRESSURE: 90 MMHG | OXYGEN SATURATION: 97 % | HEIGHT: 76 IN | BODY MASS INDEX: 36.34 KG/M2 | HEART RATE: 75 BPM | RESPIRATION RATE: 16 BRPM | TEMPERATURE: 97.2 F | SYSTOLIC BLOOD PRESSURE: 144 MMHG

## 2024-05-31 DIAGNOSIS — E66.9 OBESITY (BMI 30-39.9): ICD-10-CM

## 2024-05-31 DIAGNOSIS — Z12.5 SCREENING FOR PROSTATE CANCER: ICD-10-CM

## 2024-05-31 DIAGNOSIS — E11.9 TYPE 2 DIABETES MELLITUS WITHOUT COMPLICATION, WITHOUT LONG-TERM CURRENT USE OF INSULIN (MULTI): Primary | ICD-10-CM

## 2024-05-31 DIAGNOSIS — I10 ESSENTIAL (PRIMARY) HYPERTENSION: ICD-10-CM

## 2024-05-31 DIAGNOSIS — Z99.89 AMBULATES WITH CANE: ICD-10-CM

## 2024-05-31 DIAGNOSIS — Z91.89 OTHER SPECIFIED PERSONAL RISK FACTORS, NOT ELSEWHERE CLASSIFIED: ICD-10-CM

## 2024-05-31 LAB — POC HEMOGLOBIN A1C: 7.4 % (ref 4.2–6.5)

## 2024-05-31 PROCEDURE — 4010F ACE/ARB THERAPY RXD/TAKEN: CPT | Performed by: NURSE PRACTITIONER

## 2024-05-31 PROCEDURE — 3077F SYST BP >= 140 MM HG: CPT | Performed by: NURSE PRACTITIONER

## 2024-05-31 PROCEDURE — 3080F DIAST BP >= 90 MM HG: CPT | Performed by: NURSE PRACTITIONER

## 2024-05-31 PROCEDURE — 3048F LDL-C <100 MG/DL: CPT | Performed by: NURSE PRACTITIONER

## 2024-05-31 PROCEDURE — 1036F TOBACCO NON-USER: CPT | Performed by: NURSE PRACTITIONER

## 2024-05-31 PROCEDURE — 99214 OFFICE O/P EST MOD 30 MIN: CPT | Performed by: NURSE PRACTITIONER

## 2024-05-31 PROCEDURE — 3044F HG A1C LEVEL LT 7.0%: CPT | Performed by: NURSE PRACTITIONER

## 2024-05-31 PROCEDURE — 83036 HEMOGLOBIN GLYCOSYLATED A1C: CPT | Performed by: NURSE PRACTITIONER

## 2024-05-31 RX ORDER — NAPROXEN SODIUM 220 MG/1
1 TABLET ORAL DAILY
COMMUNITY

## 2024-05-31 RX ORDER — METFORMIN HYDROCHLORIDE 500 MG/1
500 TABLET ORAL
Qty: 180 TABLET | Refills: 1 | Status: SHIPPED | OUTPATIENT
Start: 2024-05-31

## 2024-05-31 RX ORDER — ATORVASTATIN CALCIUM 20 MG/1
20 TABLET, FILM COATED ORAL DAILY
Qty: 90 TABLET | Refills: 1 | Status: SHIPPED | OUTPATIENT
Start: 2024-05-31

## 2024-05-31 RX ORDER — LISINOPRIL 20 MG/1
20 TABLET ORAL DAILY
Qty: 90 TABLET | Refills: 1 | Status: SHIPPED | OUTPATIENT
Start: 2024-05-31

## 2024-05-31 ASSESSMENT — ENCOUNTER SYMPTOMS
BLOOD IN STOOL: 0
ENDOCRINE NEGATIVE: 1
VOMITING: 0
ABDOMINAL PAIN: 0
BRUISES/BLEEDS EASILY: 0
FATIGUE: 0
ACTIVITY CHANGE: 0
SPEECH DIFFICULTY: 0
WHEEZING: 0
APPETITE CHANGE: 0
POLYDIPSIA: 0
CONFUSION: 0
PALPITATIONS: 0
NAUSEA: 0
CHEST TIGHTNESS: 0
FEVER: 0
CHILLS: 0
CONSTIPATION: 0
HUNGER: 0
NERVOUS/ANXIOUS: 0
GASTROINTESTINAL NEGATIVE: 1
ADENOPATHY: 0
BLACKOUTS: 0
POLYPHAGIA: 0
DIAPHORESIS: 0
WEIGHT LOSS: 0
CARDIOVASCULAR NEGATIVE: 1
EYES NEGATIVE: 1
HEADACHES: 0
SHORTNESS OF BREATH: 0
TREMORS: 0
ABDOMINAL DISTENTION: 0
VISUAL CHANGE: 0
DIARRHEA: 0
HYPERTENSION: 1
RESPIRATORY NEGATIVE: 1
BLURRED VISION: 0
SEIZURES: 0
SWEATS: 0
WEAKNESS: 0
DIZZINESS: 0

## 2024-05-31 NOTE — PROGRESS NOTES
Schedule a return appointment in 4 months    Return scheduling phone number:  [phone]    Nicole Charles RN:  [phone]  Clinic Care Coordinator    After hours emergency phone number:  [phone] Ask to have Dr. Acosta called         Subjective   Patient ID: Eric Tinajero is a 64 y.o. male who presents for Hypertension and Diabetes.    Hypertension  Pertinent negatives include no blurred vision, chest pain, headaches, palpitations, shortness of breath or sweats. There is no history of CVA, PVD or retinopathy.   Diabetes  He has type 2 diabetes mellitus. No MedicAlert identification noted. The initial diagnosis of diabetes was made 5 years ago. Pertinent negatives for hypoglycemia include no confusion, dizziness, headaches, hunger, mood changes, nervousness/anxiousness, pallor, seizures, sleepiness, speech difficulty, sweats or tremors. Pertinent negatives for diabetes include no blurred vision, no chest pain, no fatigue, no foot paresthesias, no foot ulcerations, no polydipsia, no polyphagia, no polyuria, no visual change, no weakness and no weight loss. Pertinent negatives for hypoglycemia complications include no blackouts, no hospitalization, no nocturnal hypoglycemia, no required assistance and no required glucagon injection. Symptoms are stable. Diabetic complications include peripheral neuropathy. Pertinent negatives for diabetic complications include no CVA, heart disease, impotence, nephropathy, PVD or retinopathy. Risk factors for coronary artery disease include family history, hypertension, obesity, sedentary lifestyle and stress. Current diabetic treatment includes oral agent (monotherapy). He is compliant with treatment most of the time. His weight is stable. He is following a generally healthy diet. When asked about meal planning, he reported none. He has not had a previous visit with a dietitian. He participates in exercise intermittently. He monitors blood glucose at home 1-2 x per week. He monitors urine at home <1 x per month. Blood glucose monitoring compliance is inadequate. He sees a podiatrist.Eye exam is current.      Patient in office for follow-up on hypertension, hyperlipidemia, and diabetes. He needs a blood pressure  "check, A1C recheck, PSA check, and medication refill. He follows up with gastroenterology for elevated liver enzymes and colon cancer screenings (last 2023, per patient report). 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 He follows up with orthopedics for chronic right knee pain; ambulates with cane with stable and steady gait. No other concerns today.    BP: borderline/controlled    Sees ophthalmologist regularly.     Monitors his feet for lesions.    Review of Systems   Constitutional:  Negative for activity change, appetite change, chills, diaphoresis, fatigue, fever and weight loss.   HENT: Negative.     Eyes: Negative.  Negative for blurred vision.   Respiratory: Negative.  Negative for chest tightness, shortness of breath and wheezing.    Cardiovascular: Negative.  Negative for chest pain, palpitations and leg swelling.   Gastrointestinal: Negative.  Negative for abdominal distention, abdominal pain, blood in stool, constipation, diarrhea, nausea and vomiting.   Endocrine: Negative.  Negative for cold intolerance, heat intolerance, polydipsia, polyphagia and polyuria.   Genitourinary: Negative.  Negative for impotence.   Skin:  Negative for pallor.   Neurological:  Negative for dizziness, tremors, seizures, syncope, speech difficulty, weakness and headaches.   Hematological:  Negative for adenopathy. Does not bruise/bleed easily.   Psychiatric/Behavioral:  Negative for confusion. The patient is not nervous/anxious.        Objective   /90   Pulse 75   Temp 36.2 °C (97.2 °F) (Temporal)   Resp 16   Ht 1.937 m (6' 4.25\")   Wt 135 kg (298 lb 6.4 oz)   SpO2 97%   BMI 36.08 kg/m²     Physical Exam  Constitutional:       Appearance: Normal appearance.   HENT:      Head: Normocephalic.   Eyes:      Conjunctiva/sclera: Conjunctivae normal.   Cardiovascular:      Rate and Rhythm: Normal rate and regular rhythm.      Pulses: Normal pulses.      Heart sounds: Normal heart sounds. "   Pulmonary:      Effort: Pulmonary effort is normal.      Breath sounds: Normal breath sounds.   Neurological:      General: No focal deficit present.      Mental Status: He is alert.      Gait: Gait normal.      Comments: Stable and steady gait on cane       Assessment/Plan     Exam findings reviewed with patient. Medications, specialty provider follow-ups, and and lab work orders reviewed. Patient will keep monitoring blood pressures and blood sugars at home; he will call the office with outliers or abnormal trends. We will call with lab results and update the patient on the plan of care. Benefits of healthy lifestyle, diet, and exercise reviewed. Follow up in 6 months for a reevaluation or earlier as needed.

## 2024-06-07 ENCOUNTER — OFFICE VISIT (OUTPATIENT)
Dept: ORTHOPEDIC SURGERY | Facility: HOSPITAL | Age: 64
End: 2024-06-07
Payer: COMMERCIAL

## 2024-06-07 ENCOUNTER — LAB (OUTPATIENT)
Dept: LAB | Facility: LAB | Age: 64
End: 2024-06-07
Payer: COMMERCIAL

## 2024-06-07 ENCOUNTER — HOSPITAL ENCOUNTER (OUTPATIENT)
Dept: RADIOLOGY | Facility: HOSPITAL | Age: 64
Discharge: HOME | End: 2024-06-07
Payer: COMMERCIAL

## 2024-06-07 VITALS — WEIGHT: 297.62 LBS | BODY MASS INDEX: 36.24 KG/M2 | HEIGHT: 76 IN

## 2024-06-07 DIAGNOSIS — M25.561 RIGHT KNEE PAIN, UNSPECIFIED CHRONICITY: ICD-10-CM

## 2024-06-07 DIAGNOSIS — Z12.5 SCREENING FOR PROSTATE CANCER: ICD-10-CM

## 2024-06-07 DIAGNOSIS — E11.9 TYPE 2 DIABETES MELLITUS WITHOUT COMPLICATION, WITHOUT LONG-TERM CURRENT USE OF INSULIN (MULTI): ICD-10-CM

## 2024-06-07 DIAGNOSIS — E66.9 OBESITY (BMI 30-39.9): ICD-10-CM

## 2024-06-07 LAB
PSA SERPL-MCNC: 0.82 NG/ML
T3FREE SERPL-MCNC: 3.5 PG/ML (ref 2.3–4.2)
T4 FREE SERPL-MCNC: 1.38 NG/DL (ref 0.78–1.48)
TSH SERPL-ACNC: 3.17 MIU/L (ref 0.44–3.98)

## 2024-06-07 PROCEDURE — L1812 KO ELASTIC W/JOINTS PRE OTS: HCPCS | Performed by: ORTHOPAEDIC SURGERY

## 2024-06-07 PROCEDURE — 73560 X-RAY EXAM OF KNEE 1 OR 2: CPT | Mod: RT

## 2024-06-07 PROCEDURE — 84481 FREE ASSAY (FT-3): CPT

## 2024-06-07 PROCEDURE — 84439 ASSAY OF FREE THYROXINE: CPT

## 2024-06-07 PROCEDURE — 3048F LDL-C <100 MG/DL: CPT | Performed by: ORTHOPAEDIC SURGERY

## 2024-06-07 PROCEDURE — 84443 ASSAY THYROID STIM HORMONE: CPT

## 2024-06-07 PROCEDURE — 1036F TOBACCO NON-USER: CPT | Performed by: ORTHOPAEDIC SURGERY

## 2024-06-07 PROCEDURE — 84153 ASSAY OF PSA TOTAL: CPT

## 2024-06-07 PROCEDURE — 99204 OFFICE O/P NEW MOD 45 MIN: CPT | Performed by: ORTHOPAEDIC SURGERY

## 2024-06-07 PROCEDURE — 3044F HG A1C LEVEL LT 7.0%: CPT | Performed by: ORTHOPAEDIC SURGERY

## 2024-06-07 PROCEDURE — 73560 X-RAY EXAM OF KNEE 1 OR 2: CPT | Mod: RIGHT SIDE | Performed by: STUDENT IN AN ORGANIZED HEALTH CARE EDUCATION/TRAINING PROGRAM

## 2024-06-07 PROCEDURE — 36415 COLL VENOUS BLD VENIPUNCTURE: CPT

## 2024-06-07 PROCEDURE — 4010F ACE/ARB THERAPY RXD/TAKEN: CPT | Performed by: ORTHOPAEDIC SURGERY

## 2024-06-07 PROCEDURE — 99214 OFFICE O/P EST MOD 30 MIN: CPT | Performed by: ORTHOPAEDIC SURGERY

## 2024-06-07 RX ORDER — MELOXICAM 15 MG/1
15 TABLET ORAL DAILY
Qty: 90 TABLET | Refills: 0 | Status: SHIPPED | OUTPATIENT
Start: 2024-06-07 | End: 2024-09-05

## 2024-06-07 ASSESSMENT — PAIN - FUNCTIONAL ASSESSMENT: PAIN_FUNCTIONAL_ASSESSMENT: 0-10

## 2024-06-07 ASSESSMENT — PAIN SCALES - GENERAL: PAINLEVEL_OUTOF10: 8

## 2024-06-07 ASSESSMENT — PAIN DESCRIPTION - DESCRIPTORS: DESCRIPTORS: ACHING;THROBBING;SHARP;SHOOTING

## 2024-06-07 NOTE — PROGRESS NOTES
This 64-year-old male with a 3-week exacerbation of acute on chronic right knee pain.  He has a history of multiple knee injuries in the past.  This started when he was quite young.  He felt the click and a pop while walking about 3 weeks ago has noted a lateral knee based pain flare since then.  He was using a standard compression sleeve brace that made it kind of tight and causing increased anterior pressure on his patella.  He has not noticed any carrie instability but has pain with activity and is using a cane to ambulate.  He has a history of multiple knee surgeries for ligamentous repairs in the past as well.    T that is no problem he patients full medical history, surgical history, medications, allergies, family, medical history, social history, and a complete 30 point review of systems is documented in the medical record on the signed, scanned medical intake sheet or reviewed in the history of present illness.    Gen: The patient is alert and oriented ×3, is in no acute distress, and appear their stated age and weight.    Psychiatric: Mood and affect are appropriate.    Eyes: Sclera are white, and pupils are round and symmetric.    ENT: Mucous membranes are moist.     Neck: Supple. Thyroid is midline.    Respiratory: Respirations are nonlabored, chest rise is symmetric.    Cardiac: Rate is regular by palpation of distal pulses.     Abdomen: Nondistended.    Integument: No obvious cutaneous lesions are noted. No signs of lymphangitis. No signs of systemic edema.    Musculoskeletal examination of his right knee demonstrates some arthritic topography.  Previous well-healed anteromedial scar.  The knee is stable to varus and valgus stress at zero and 30° of flexion. Lachman's test and posterior drawer are negative. Medial and lateral Jose Martin's tests are negative. Patellar apprehension is negative. Patellar grind test is positive.  Pain to palpation over the lateral joint line.  Minimal over medial joint line.   He has about a 15 degree flexion contracture.  He can flex past 90.    Multiple views of his right knee demonstrate osteoarthritis with chondrocalcinosis of the menisci, tricompartmental arthritis is noted.    64-year-old male with symptomatic tricompartmental osteoarthritis of the right knee.  Meloxicam was prescribed along with physical therapy.  A knee sleeve with hinges was also prescribed to increase stability and proprioception.  Patient was prescribed a knee sleeve with hinges for knee arthritis.The patient is ambulatory with or without aid; but, has weakness, instability and/or deformity of their right lower extremity which requires stabilization from this orthosis to improve their function.      Verbal and written instructions for the use, wear schedule, cleaning and application of this item were given.  Patient was instructed that should the orthotic device result in increased pain, decreased sensation, increased swelling, or an overall worsening of their medical condition, to please contact our office immediately.     Orthotic management and training was provided for skin care, modifications due to healing tissues, edema changes, interruption in skin integrity, and safety precautions with the orthosis.    If you were to still have the same symptoms after the next 3 to 4 weeks he can call the office to schedule an intra-articular corticosteroid injection to treat his arthritis and knee pain.    Natural History reviewed. All questions answered. The patient was in agreement with the plan.      **This note was created using voice recognition software and was not corrected for typographical or grammatical errors.**

## 2024-06-09 LAB
PATH REPORT.ADDENDUM SPEC: NORMAL
PATH REPORT.COMMENTS IMP SPEC: NORMAL
PATH REPORT.FINAL DX SPEC: NORMAL
PATH REPORT.GROSS SPEC: NORMAL
PATH REPORT.MICROSCOPIC SPEC OTHER STN: NORMAL
PATH REPORT.RELEVANT HX SPEC: NORMAL
PATH REPORT.TOTAL CANCER: NORMAL

## 2024-06-14 LAB
ELECTRONICALLY SIGNED BY CYTOGENETICS: NORMAL
MICROARRAY PLATFORM: NORMAL

## 2024-11-18 ENCOUNTER — APPOINTMENT (OUTPATIENT)
Dept: HEMATOLOGY/ONCOLOGY | Facility: HOSPITAL | Age: 64
End: 2024-11-18
Payer: COMMERCIAL

## 2024-11-23 LAB
ALBUMIN SERPL BCP-MCNC: 4.7 G/DL (ref 3.4–5)
ALP SERPL-CCNC: 48 U/L (ref 33–136)
ALT SERPL W P-5'-P-CCNC: 86 U/L (ref 10–52)
ANION GAP SERPL CALC-SCNC: 13 MMOL/L (ref 10–20)
AST SERPL W P-5'-P-CCNC: 54 U/L (ref 9–39)
BASOPHILS # BLD AUTO: 0.05 X10*3/UL (ref 0–0.1)
BASOPHILS NFR BLD AUTO: 0.7 %
BILIRUB SERPL-MCNC: 0.8 MG/DL (ref 0–1.2)
BUN SERPL-MCNC: 13 MG/DL (ref 6–23)
CALCIUM SERPL-MCNC: 10.1 MG/DL (ref 8.6–10.6)
CHLORIDE SERPL-SCNC: 101 MMOL/L (ref 98–107)
CO2 SERPL-SCNC: 31 MMOL/L (ref 21–32)
CREAT SERPL-MCNC: 0.88 MG/DL (ref 0.5–1.3)
EGFRCR SERPLBLD CKD-EPI 2021: >90 ML/MIN/1.73M*2
EOSINOPHIL # BLD AUTO: 0.1 X10*3/UL (ref 0–0.7)
EOSINOPHIL NFR BLD AUTO: 1.3 %
ERYTHROCYTE [DISTWIDTH] IN BLOOD BY AUTOMATED COUNT: 12 % (ref 11.5–14.5)
GLUCOSE SERPL-MCNC: 164 MG/DL (ref 74–99)
HCT VFR BLD AUTO: 47.3 % (ref 41–52)
HGB BLD-MCNC: 16.9 G/DL (ref 13.5–17.5)
IMM GRANULOCYTES # BLD AUTO: 0.03 X10*3/UL (ref 0–0.7)
IMM GRANULOCYTES NFR BLD AUTO: 0.4 % (ref 0–0.9)
LDH SERPL L TO P-CCNC: 189 U/L (ref 84–246)
LYMPHOCYTES # BLD AUTO: 1.84 X10*3/UL (ref 1.2–4.8)
LYMPHOCYTES NFR BLD AUTO: 24.6 %
MCH RBC QN AUTO: 31.7 PG (ref 26–34)
MCHC RBC AUTO-ENTMCNC: 35.7 G/DL (ref 32–36)
MCV RBC AUTO: 89 FL (ref 80–100)
MONOCYTES # BLD AUTO: 1.79 X10*3/UL (ref 0.1–1)
MONOCYTES NFR BLD AUTO: 24 %
NEUTROPHILS # BLD AUTO: 3.66 X10*3/UL (ref 1.2–7.7)
NEUTROPHILS NFR BLD AUTO: 49 %
NRBC BLD-RTO: 0 /100 WBCS (ref 0–0)
PLATELET # BLD AUTO: 173 X10*3/UL (ref 150–450)
POTASSIUM SERPL-SCNC: 4.7 MMOL/L (ref 3.5–5.3)
PROT SERPL-MCNC: 7.2 G/DL (ref 6.4–8.2)
RBC # BLD AUTO: 5.33 X10*6/UL (ref 4.5–5.9)
SODIUM SERPL-SCNC: 140 MMOL/L (ref 136–145)
WBC # BLD AUTO: 7.5 X10*3/UL (ref 4.4–11.3)

## 2024-11-23 PROCEDURE — 85025 COMPLETE CBC W/AUTO DIFF WBC: CPT | Performed by: INTERNAL MEDICINE

## 2024-11-23 PROCEDURE — 83615 LACTATE (LD) (LDH) ENZYME: CPT | Performed by: INTERNAL MEDICINE

## 2024-11-23 PROCEDURE — 80053 COMPREHEN METABOLIC PANEL: CPT | Performed by: INTERNAL MEDICINE

## 2024-11-25 ENCOUNTER — OFFICE VISIT (OUTPATIENT)
Dept: HEMATOLOGY/ONCOLOGY | Facility: HOSPITAL | Age: 64
End: 2024-11-25
Payer: COMMERCIAL

## 2024-11-25 VITALS
BODY MASS INDEX: 35.35 KG/M2 | SYSTOLIC BLOOD PRESSURE: 151 MMHG | OXYGEN SATURATION: 98 % | RESPIRATION RATE: 22 BRPM | TEMPERATURE: 96.3 F | HEART RATE: 86 BPM | WEIGHT: 292.33 LBS | DIASTOLIC BLOOD PRESSURE: 76 MMHG

## 2024-11-25 DIAGNOSIS — C93.10 CHRONIC MYELOMONOCYTIC LEUKEMIA NOT HAVING ACHIEVED REMISSION (MULTI): Primary | ICD-10-CM

## 2024-11-25 PROCEDURE — 3078F DIAST BP <80 MM HG: CPT | Performed by: INTERNAL MEDICINE

## 2024-11-25 PROCEDURE — 3048F LDL-C <100 MG/DL: CPT | Performed by: INTERNAL MEDICINE

## 2024-11-25 PROCEDURE — 99214 OFFICE O/P EST MOD 30 MIN: CPT | Performed by: INTERNAL MEDICINE

## 2024-11-25 PROCEDURE — 3077F SYST BP >= 140 MM HG: CPT | Performed by: INTERNAL MEDICINE

## 2024-11-25 PROCEDURE — 4010F ACE/ARB THERAPY RXD/TAKEN: CPT | Performed by: INTERNAL MEDICINE

## 2024-11-25 PROCEDURE — 3044F HG A1C LEVEL LT 7.0%: CPT | Performed by: INTERNAL MEDICINE

## 2024-11-25 ASSESSMENT — PAIN SCALES - GENERAL: PAINLEVEL_OUTOF10: 0-NO PAIN

## 2024-11-25 NOTE — ASSESSMENT & PLAN NOTE
11/25/2024: Since the patient's last visit in the spring, his hematologic parameters continue to remain quite stable.  I reviewed that his monocytes have been higher in the past but have also been lower continue to move around at different intervals, but perhaps more critically his hemoglobin his platelets and his neutrophils are all quite stable.  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

## 2024-11-25 NOTE — LETTER
2024      TO: ANGIE Wiggins APRN-CNP  [address]  [facility] [address]       Regarding:   Patient:  :  Visit Date: Eric Tinajero  1960  2024       Dear ANGIE Jane     I saw our mutual patient Eric Tinajero for an interval visit in the malignant hematology clinic at [facility].  Please see below for my notes from this encounter. Please do not hesitate to call me if you have any questions. I look forward to continuing to follow your patient along with you.      Sincerely,    Mundo Garcia MD         CC: ANGIE iWggins  [address]  [facility] [address]  Via In Basket    ANGIE Wiggins  [address]  [facility] [address]       Please see my documentation below:   Patient ID: Eric Tinajero is a 64 y.o. male.  Referring Physician: Mundo Garcia MD  [address] [place] [address]  Primary Care Provider: ANGIE Wiggins    Date of Service:  2024    Mr. Tinajero     He reports that he lost about 8 lbs since his last visit, but wasn't trying - but stil would have more to do.  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  I reviewed that his monocytes have been higher in the past but have also been lower continue to move around at different intervals, but perhaps more critically his hemoglobin his platelets and his neutrophils are all quite stable.  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 based on the ICC convention, regardless I think monitoring continues to be most appropriate we will continue see him every 6 months  Diagnostics:  - none pending  Treatment:  - none indicated    Disease Monitoring:  - every six month hematology follow up (reviewed ELPIDIO help and involvement)                  Oncology History   CMML (chronic myelomonocytic leukemia) (Multi)   5/20/2024 Initial Diagnosis    Clonal Monocytosis of Undetermined Significance (ICC) vs CMML (chronic myelomonocytic leukemia)-1 (WHO)     Diagnosis:   Patient was referred to hematology on 1/12/2024 in the setting of an abnormal CBC.  Initial evaluation noted monocytosis since 2/5/2018, ranging 1.08-1.83 and workup identified monocytic atypia.  Was arranged for bone marrow biopsy on 4/24/2024, which demonstrated: -- NORMOCELLULAR BONE MARROW (40%) WITH MILD MONOCYTOSIS AND NORMAL TRILINEAGE HEMATOPOIESIS, SEE NOTE.   NOTE: There is a peripheral monocytosis which appears persistent based on review of the medical record. Morphologically, no definite dysplasia is noted and monocytes appear mostly normal. Blasts are not increased. By flow cytometry, a reduction in non-classical monocytes is noted without other abnormalities. Recent myeloid NGS from the blood demonstrated two TET2 alterations at variant allele fraction (VAF) of 22%. 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 Of note WHO 2022 classification does not include CMML-0 which the process would have been previously been categorized. Moreover, by ICC 2022 classification, the process is based considered a clonal monocytosis of undetermined significance since there is no overt evidence of dyspoiesis or increase in blasts in the marrow or blood.  Additional genetic studies are pending. Clinical correlation recommended.  Molecular: TET2 mutations x2 (max VAF 22%)  Karyotype: 4/17 PB FISH for BCR-ABL negative. FISH negative for deletion 5q/monosomy 5, deletion 7q/monosomy 7, trisomy 8,  deletion 17p/monosomy 17, and deletion 20q.  Break apart FISH negative for FGFR1 rearrangement and KMT2A rearrangements.  Pending PDGFR FISH  Risk stratification (assumes normal karyotype will be confirmed)  CPSS-Mol - 0 - low risk  GFM - low risk  Vasquez Molecular Model - low risk    Treatment:   None to date - will be recommended observation              Subjective    Reviewed and Past Medical History, Past Surgical History, Family History, and Social History:    Review of Systems   Constitutional:  Negative for activity change, appetite change, chills, diaphoresis, fatigue, fever and unexpected weight change.   HENT:  Negative for congestion, mouth sores, nosebleeds, rhinorrhea, sinus pressure, sinus pain and sore throat.    Eyes:  Negative for visual disturbance.   Respiratory:  Negative for cough and shortness of breath.    Cardiovascular:  Negative for chest pain and leg swelling.   Gastrointestinal:  Negative for abdominal pain, constipation, diarrhea, nausea and vomiting.   Genitourinary:  Negative for difficulty urinating and flank pain.   Musculoskeletal:  Negative for back pain and joint swelling.        Admits to chronic joint pain   Skin:  Negative for rash.   Neurological:  Negative for weakness, light-headedness, numbness and headaches.   Hematological:  Negative for adenopathy. Does not bruise/bleed easily.   Psychiatric/Behavioral:  Negative for confusion and dysphoric mood. The patient is not nervous/anxious.        Home Medications and Adherence Reviewed with Patient.       Objective     VS:  /76 (BP Location: Left arm, Patient Position: Sitting, BP Cuff Size: Large adult)   Pulse 86   Temp 35.7 °C (96.3 °F) (Temporal)   Resp 22   Wt 133 kg (292 lb 5.3 oz)   SpO2 98%   BMI 35.35 kg/m²   BSA: 2.67 meters squared  KPS: 100    Physical Exam  Constitutional:       Appearance: Normal appearance.   HENT:      Mouth/Throat:      Mouth: Mucous membranes are moist.   Eyes:       Conjunctiva/sclera: Conjunctivae normal.      Pupils: Pupils are equal, round, and reactive to light.   Abdominal:      General: Abdomen is flat.      Palpations: Abdomen is soft. There is no splenomegaly.   Musculoskeletal:         General: Normal range of motion.   Lymphadenopathy:      Head:      Right side of head: No submental, submandibular, tonsillar, preauricular, posterior auricular or occipital adenopathy.      Left side of head: No submental, submandibular, tonsillar, preauricular, posterior auricular or occipital adenopathy.      Cervical: No cervical adenopathy.      Right cervical: No superficial cervical adenopathy.     Left cervical: No superficial cervical adenopathy.   Skin:     General: Skin is warm and dry.   Neurological:      General: No focal deficit present.      Mental Status: He is oriented to person, place, and time.   Psychiatric:         Mood and Affect: Mood normal.         Behavior: Behavior normal.         Laboratory:  Pertinent laboratory results were reviewed and discussed with patient, notably:   Monocytes 1.79 on his most recent CBC, other hematologic parameters were normal            Mundo Garcia MD

## 2024-11-25 NOTE — PROGRESS NOTES
Patient ID: Eric Tinajero is a 64 y.o. male.  Referring Physician: Mundo Garcia MD  [address] [id number]  Primary Care Provider: ANGIE Wiggins    Date of Service:  11/25/2024    Mr. Tinajero     He reports that he lost about 8 lbs since his last visit, but wasn't trying - but stil would have more to do.  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  I reviewed that his monocytes have been higher in the past but have also been lower continue to move around at different intervals, but perhaps more critically his hemoglobin his platelets and his neutrophils are all quite stable.  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 based on the ICC convention, regardless I think monitoring continues to be most appropriate we will continue see him every 6 months  Diagnostics:  - none pending  Treatment:  - none indicated   Disease Monitoring:  - every six month hematology follow up (reviewed ELPIDIO help and involvement)                  Oncology History   CMML (chronic myelomonocytic leukemia) (Multi)   5/20/2024 Initial Diagnosis    Clonal Monocytosis of Undetermined Significance (ICC) vs CMML (chronic myelomonocytic leukemia)-1 (WHO)     Diagnosis:   Patient was referred to hematology on 1/12/2024 in the setting of an abnormal CBC.  Initial evaluation noted monocytosis since 2/5/2018, ranging 1.08-1.83 and workup identified monocytic atypia.  Was arranged for bone marrow biopsy on 4/24/2024, which demonstrated: -- NORMOCELLULAR BONE MARROW (40%) WITH MILD MONOCYTOSIS AND NORMAL TRILINEAGE HEMATOPOIESIS, SEE NOTE.   NOTE: There is a peripheral monocytosis which appears persistent based  on review of the medical record. Morphologically, no definite dysplasia is noted and monocytes appear mostly normal. Blasts are not increased. By flow cytometry, a reduction in non-classical monocytes is noted without other abnormalities. Recent myeloid NGS from the blood demonstrated two TET2 alterations at variant allele fraction (VAF) of 22%. 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 Of note WHO 2022 classification does not include CMML-0 which the process would have been previously been categorized. Moreover, by ICC 2022 classification, the process is based considered a clonal monocytosis of undetermined significance since there is no overt evidence of dyspoiesis or increase in blasts in the marrow or blood.  Additional genetic studies are pending. Clinical correlation recommended.  Molecular: TET2 mutations x2 (max VAF 22%)  Karyotype: 4/17 PB FISH for BCR-ABL negative. FISH negative for deletion 5q/monosomy 5, deletion 7q/monosomy 7, trisomy 8, deletion 17p/monosomy 17, and deletion 20q.  Break apart FISH negative for FGFR1 rearrangement and KMT2A rearrangements.  Pending PDGFR FISH  Risk stratification (assumes normal karyotype will be confirmed)  CPSS-Mol - 0 - low risk  GFM - low risk  Vasquez Molecular Model - low risk    Treatment:   None to date - will be recommended observation              Subjective     Reviewed and Past Medical History, Past Surgical History, Family History, and Social History:    Review of Systems   Constitutional:  Negative for activity change, appetite change, chills, diaphoresis, fatigue, fever and unexpected weight change.   HENT:  Negative for congestion, mouth sores, nosebleeds, rhinorrhea, sinus pressure, sinus pain and sore throat.    Eyes:  Negative for visual disturbance.    Respiratory:  Negative for cough and shortness of breath.    Cardiovascular:  Negative for chest pain and leg swelling.   Gastrointestinal:  Negative for abdominal pain, constipation, diarrhea, nausea and vomiting.   Genitourinary:  Negative for difficulty urinating and flank pain.   Musculoskeletal:  Negative for back pain and joint swelling.        Admits to chronic joint pain   Skin:  Negative for rash.   Neurological:  Negative for weakness, light-headedness, numbness and headaches.   Hematological:  Negative for adenopathy. Does not bruise/bleed easily.   Psychiatric/Behavioral:  Negative for confusion and dysphoric mood. The patient is not nervous/anxious.        Home Medications and Adherence Reviewed with Patient.       Objective      VS:  /76 (BP Location: Left arm, Patient Position: Sitting, BP Cuff Size: Large adult)   Pulse 86   Temp 35.7 °C (96.3 °F) (Temporal)   Resp 22   Wt 133 kg (292 lb 5.3 oz)   SpO2 98%   BMI 35.35 kg/m²   BSA: 2.67 meters squared  KPS: 100    Physical Exam  Constitutional:       Appearance: Normal appearance.   HENT:      Mouth/Throat:      Mouth: Mucous membranes are moist.   Eyes:      Conjunctiva/sclera: Conjunctivae normal.      Pupils: Pupils are equal, round, and reactive to light.   Abdominal:      General: Abdomen is flat.      Palpations: Abdomen is soft. There is no splenomegaly.   Musculoskeletal:         General: Normal range of motion.   Lymphadenopathy:      Head:      Right side of head: No submental, submandibular, tonsillar, preauricular, posterior auricular or occipital adenopathy.      Left side of head: No submental, submandibular, tonsillar, preauricular, posterior auricular or occipital adenopathy.      Cervical: No cervical adenopathy.      Right cervical: No superficial cervical adenopathy.     Left cervical: No superficial cervical adenopathy.   Skin:     General: Skin is warm and dry.   Neurological:      General: No focal deficit present.       Mental Status: He is oriented to person, place, and time.   Psychiatric:         Mood and Affect: Mood normal.         Behavior: Behavior normal.         Laboratory:  Pertinent laboratory results were reviewed and discussed with patient, notably:   Monocytes 1.79 on his most recent CBC, other hematologic parameters were normal            Mundo Garcia MD

## 2024-12-02 ASSESSMENT — ENCOUNTER SYMPTOMS
FEVER: 0
UNEXPECTED WEIGHT CHANGE: 0
DIFFICULTY URINATING: 0
ACTIVITY CHANGE: 0
CONFUSION: 0
NAUSEA: 0
COUGH: 0
SINUS PAIN: 0
RHINORRHEA: 0
FLANK PAIN: 0
APPETITE CHANGE: 0
DIARRHEA: 0
BACK PAIN: 0
HEADACHES: 0
CONSTIPATION: 0
JOINT SWELLING: 0
SHORTNESS OF BREATH: 0
SORE THROAT: 0
NERVOUS/ANXIOUS: 0
LIGHT-HEADEDNESS: 0
VOMITING: 0
DYSPHORIC MOOD: 0
WEAKNESS: 0
ABDOMINAL PAIN: 0
BRUISES/BLEEDS EASILY: 0
FATIGUE: 0
ADENOPATHY: 0
DIAPHORESIS: 0
SINUS PRESSURE: 0
CHILLS: 0
NUMBNESS: 0

## 2024-12-09 ASSESSMENT — PROMIS GLOBAL HEALTH SCALE
CARRYOUT_PHYSICAL_ACTIVITIES: COMPLETELY
RATE_GENERAL_HEALTH: GOOD
RATE_SOCIAL_SATISFACTION: GOOD
RATE_QUALITY_OF_LIFE: GOOD
CARRYOUT_SOCIAL_ACTIVITIES: GOOD
RATE_MENTAL_HEALTH: GOOD
RATE_AVERAGE_PAIN: 2
RATE_PHYSICAL_HEALTH: GOOD
EMOTIONAL_PROBLEMS: NEVER

## 2024-12-10 ENCOUNTER — APPOINTMENT (OUTPATIENT)
Dept: PRIMARY CARE | Facility: CLINIC | Age: 64
End: 2024-12-10
Payer: COMMERCIAL

## 2024-12-15 ASSESSMENT — PROMIS GLOBAL HEALTH SCALE
CARRYOUT_SOCIAL_ACTIVITIES: GOOD
RATE_MENTAL_HEALTH: GOOD
RATE_SOCIAL_SATISFACTION: GOOD
RATE_QUALITY_OF_LIFE: GOOD
EMOTIONAL_PROBLEMS: NEVER
CARRYOUT_PHYSICAL_ACTIVITIES: COMPLETELY
RATE_PHYSICAL_HEALTH: GOOD
RATE_GENERAL_HEALTH: GOOD
RATE_AVERAGE_PAIN: 2

## 2024-12-16 ENCOUNTER — APPOINTMENT (OUTPATIENT)
Dept: PRIMARY CARE | Facility: CLINIC | Age: 64
End: 2024-12-16
Payer: COMMERCIAL

## 2024-12-16 VITALS
SYSTOLIC BLOOD PRESSURE: 138 MMHG | OXYGEN SATURATION: 97 % | TEMPERATURE: 97.3 F | HEIGHT: 75 IN | WEIGHT: 288.8 LBS | DIASTOLIC BLOOD PRESSURE: 86 MMHG | HEART RATE: 69 BPM | RESPIRATION RATE: 16 BRPM | BODY MASS INDEX: 35.91 KG/M2

## 2024-12-16 DIAGNOSIS — E11.9 TYPE 2 DIABETES MELLITUS WITHOUT COMPLICATION, WITHOUT LONG-TERM CURRENT USE OF INSULIN (MULTI): ICD-10-CM

## 2024-12-16 DIAGNOSIS — I10 ESSENTIAL (PRIMARY) HYPERTENSION: ICD-10-CM

## 2024-12-16 DIAGNOSIS — Z12.5 SCREENING FOR PROSTATE CANCER: ICD-10-CM

## 2024-12-16 DIAGNOSIS — E66.9 OBESITY (BMI 30-39.9): ICD-10-CM

## 2024-12-16 DIAGNOSIS — Z00.00 HEALTHCARE MAINTENANCE: Primary | ICD-10-CM

## 2024-12-16 DIAGNOSIS — I10 HYPERTENSION, UNSPECIFIED TYPE: ICD-10-CM

## 2024-12-16 DIAGNOSIS — Z91.89 OTHER SPECIFIED PERSONAL RISK FACTORS, NOT ELSEWHERE CLASSIFIED: ICD-10-CM

## 2024-12-16 LAB
CHOLEST SERPL-MCNC: 99 MG/DL (ref 0–199)
CHOLESTEROL/HDL RATIO: 2.5
HDLC SERPL-MCNC: 38.9 MG/DL
LDLC SERPL CALC-MCNC: 33 MG/DL
NON HDL CHOLESTEROL: 60 MG/DL (ref 0–149)
POC HEMOGLOBIN A1C: 7.5 % (ref 4.2–6.5)
PSA SERPL-MCNC: 0.74 NG/ML
TRIGL SERPL-MCNC: 138 MG/DL (ref 0–149)
TSH SERPL-ACNC: 2.96 MIU/L (ref 0.44–3.98)
VLDL: 28 MG/DL (ref 0–40)

## 2024-12-16 PROCEDURE — 99396 PREV VISIT EST AGE 40-64: CPT | Performed by: NURSE PRACTITIONER

## 2024-12-16 PROCEDURE — 84153 ASSAY OF PSA TOTAL: CPT

## 2024-12-16 PROCEDURE — 3079F DIAST BP 80-89 MM HG: CPT | Performed by: NURSE PRACTITIONER

## 2024-12-16 PROCEDURE — 83036 HEMOGLOBIN GLYCOSYLATED A1C: CPT | Performed by: NURSE PRACTITIONER

## 2024-12-16 PROCEDURE — 3048F LDL-C <100 MG/DL: CPT | Performed by: NURSE PRACTITIONER

## 2024-12-16 PROCEDURE — 3044F HG A1C LEVEL LT 7.0%: CPT | Performed by: NURSE PRACTITIONER

## 2024-12-16 PROCEDURE — 80061 LIPID PANEL: CPT

## 2024-12-16 PROCEDURE — 84443 ASSAY THYROID STIM HORMONE: CPT

## 2024-12-16 PROCEDURE — 4010F ACE/ARB THERAPY RXD/TAKEN: CPT | Performed by: NURSE PRACTITIONER

## 2024-12-16 PROCEDURE — 1036F TOBACCO NON-USER: CPT | Performed by: NURSE PRACTITIONER

## 2024-12-16 PROCEDURE — 3008F BODY MASS INDEX DOCD: CPT | Performed by: NURSE PRACTITIONER

## 2024-12-16 PROCEDURE — 3075F SYST BP GE 130 - 139MM HG: CPT | Performed by: NURSE PRACTITIONER

## 2024-12-16 RX ORDER — ATORVASTATIN CALCIUM 20 MG/1
20 TABLET, FILM COATED ORAL DAILY
Qty: 90 TABLET | Refills: 1 | Status: SHIPPED | OUTPATIENT
Start: 2024-12-16

## 2024-12-16 RX ORDER — METFORMIN HYDROCHLORIDE 500 MG/1
500 TABLET ORAL
Qty: 180 TABLET | Refills: 1 | Status: SHIPPED | OUTPATIENT
Start: 2024-12-16

## 2024-12-16 RX ORDER — LISINOPRIL 20 MG/1
20 TABLET ORAL DAILY
Qty: 90 TABLET | Refills: 1 | Status: SHIPPED | OUTPATIENT
Start: 2024-12-16

## 2024-12-16 NOTE — PROGRESS NOTES
Subjective   Patient ID: Eric Tinajero is a 64 y.o. male who presents for Annual Exam.    HPI   Patient in office for a physical. Patient describes health as good. Patient denies vision changes and hearing loss.     Lifestyle: Lab (cytotechnologist)/researcher. Patient reports healthy  diet. Patient  exercises occasionally. Patient does not smoke; he does not drink alcohol. No drug use.      Screens and immunizations:  Colon Cancer Screening: Reviewed and current. 2022/2027  Prostate Cancer Screening: Reviewed and current. 2023/order  Vision and hearing: Reviewed and current.     Immunizations:  TDaP: 2022  Flu: 2024  RSV: 2024  COVID: patient will schedule at pharmacy  Shingles: 2022/23  Pneumonia: recommended  Hep B: vaccinated (per patient report)     Concerns:  Hx of hypertension, hyperlipidemia, and diabetes. He needs a blood pressure check, A1C recheck, PSA check, and medication refill. He follows up with gastroenterology for elevated liver enzymes and colon cancer screenings. 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     Fasted for abs today.     BP: controlled  BMI: 36.5; advocated heathy diet and exercise     Sees ophthalmologist regularly.      Monitors his feet for lesions.    A1C: 7.5%; not at goal; advocated heathy diet and exercise     Review of Systems  Constitutional:  Negative for fatigue, activity change, appetite change, chills, diaphoresis, fever and unexpected weight change. HENT:  Negative for congestion, dental problem, drooling, ear discharge, ear pain, facial swelling, hearing loss, mouth sores, nosebleeds, postnasal drip, rhinorrhea, sinus pressure, sinus pain, sneezing, sore throat, tinnitus, trouble swallowing and voice change. Eyes:  Negative for photophobia, pain, discharge, redness, itching and visual disturbance.   Respiratory:  Negative for apnea, cough, choking, chest tightness, shortness of breath, wheezing and stridor.    Cardiovascular:  Negative for  "chest pain, palpitations and leg swelling.   Gastrointestinal:  Negative for abdominal distention, abdominal pain, anal bleeding, blood in stool, constipation, diarrhea, nausea, rectal pain and vomiting.   Endocrine: Negative for cold intolerance, heat intolerance, polydipsia, polyphagia and polyuria.   Genitourinary:  Negative for decreased urine volume, difficulty urinating, dysuria, enuresis, flank pain, frequency, hematuria, and urgency.   Musculoskeletal:  Negative for arthralgias, back pain, gait problem, joint swelling, myalgias, neck pain and neck stiffness.   Skin:  Negative for color change, pallor, rash and wound.   Neurological:  Negative for dizziness, tremors, seizures, syncope, facial asymmetry, speech difficulty, weakness, light-headedness, numbness and headaches.   Hematological:  Negative for adenopathy. Does not bruise/bleed easily.   Psychiatric/Behavioral:  Negative for depression, agitation, behavioral problems, confusion, hyperactivity, sleep disturbance, hallucinations, self-injury, and suicidal ideas. Negative for anxiety.      Objective   /86   Pulse 69   Temp 36.3 °C (97.3 °F) (Temporal)   Resp 16   Ht 1.892 m (6' 2.5\")   Wt 131 kg (288 lb 12.8 oz)   SpO2 97%   BMI 36.58 kg/m²     Physical Exam  Constitutional:       Appearance: Normal appearance.   Neurological     Mental Status: He is alert.   HENT:   Right ear: Cerumen impaction.  Left ear: normal: TM and ear canal visualized.  Head: Normocephalic.      Nose: Normal.      Mouth/Throat:      Mouth: Mucous membranes are moist.      Pharynx: Oropharynx is clear.   Eyes:      General: No scleral icterus.        Right eye: No discharge.         Left eye: No discharge.      Conjunctiva/sclera: Conjunctivae normal.      Pupils: Pupils are equal, round, and reactive to light.   Neck:      Vascular: No carotid bruit.   Cardiovascular:      Rate and Rhythm: Normal rate and regular rhythm.      Pulses: Normal pulses.      Heart " sounds: Normal heart sounds.   Pulmonary:      Effort: Pulmonary effort is normal.      Breath sounds: Normal breath sounds.   Abdominal:      General: Abdomen is flat. Bowel sounds are normal. There is no distension.      Palpations: Abdomen is soft. There is no mass.      Tenderness: There is no abdominal tenderness. There is no guarding or rebound.      Hernia: No hernia is present.   Musculoskeletal:         General: No swelling, tenderness, deformity or signs of injury.      Cervical back: Normal range of motion and neck supple. No rigidity or tenderness.      Right lower leg: No edema.      Left lower leg: No edema.   Lymphadenopathy:      Cervical: No cervical adenopathy.   Skin:     General: Skin is warm.      Coloration: Skin is not jaundiced or pale.      Findings: No bruising, erythema, lesion.     No rash.   Neurological:      General: No focal deficit present.      Mental Status: She is alert and oriented to person, place, and time.      Cranial Nerves: No cranial nerve deficit.      Sensory: No sensory deficit.      Motor: No weakness.      Coordination: Coordination normal.      Gait: Gait normal.   Psychiatric:         Mood and Affect: Mood normal.       Behavior: Behavior normal.         Thought Content: Thought content normal. Judgment: Judgment normal.      Assessment/Plan     Exam findings reviewed with patient. Health screens, lab work orders, and immunizations discussed. We will call with lab results when received. Patient will keep monitoring blood pressures and blood sugars at home; he will call the office with outliers or abnormal trends. Follow up in 6 months for a reevaluation or earlier as needed.       Benefits of healthy lifestyle reviewed: low carbohydrates/fat/sugar diet; use lean white instead of red meet; use several servings of fruit and vegetables daily; use whole grain flour instead of white flour products; cook at home frequently instead of eating out; exercise 30-90 minutes 5  x/week.

## 2025-03-24 ENCOUNTER — OFFICE VISIT (OUTPATIENT)
Dept: PRIMARY CARE | Facility: CLINIC | Age: 65
End: 2025-03-24
Payer: COMMERCIAL

## 2025-03-24 VITALS
RESPIRATION RATE: 16 BRPM | BODY MASS INDEX: 36.05 KG/M2 | HEART RATE: 80 BPM | DIASTOLIC BLOOD PRESSURE: 89 MMHG | TEMPERATURE: 97.5 F | OXYGEN SATURATION: 98 % | WEIGHT: 284.6 LBS | SYSTOLIC BLOOD PRESSURE: 135 MMHG

## 2025-03-24 DIAGNOSIS — R53.81 MALAISE: ICD-10-CM

## 2025-03-24 DIAGNOSIS — R19.7 DIARRHEA, UNSPECIFIED TYPE: Primary | ICD-10-CM

## 2025-03-24 DIAGNOSIS — R51.9 ACUTE NONINTRACTABLE HEADACHE, UNSPECIFIED HEADACHE TYPE: ICD-10-CM

## 2025-03-24 DIAGNOSIS — R50.9 FEVER, UNSPECIFIED FEVER CAUSE: ICD-10-CM

## 2025-03-24 DIAGNOSIS — R11.10 VOMITING, UNSPECIFIED VOMITING TYPE, UNSPECIFIED WHETHER NAUSEA PRESENT: ICD-10-CM

## 2025-03-24 PROCEDURE — 3075F SYST BP GE 130 - 139MM HG: CPT | Performed by: NURSE PRACTITIONER

## 2025-03-24 PROCEDURE — 4010F ACE/ARB THERAPY RXD/TAKEN: CPT | Performed by: NURSE PRACTITIONER

## 2025-03-24 PROCEDURE — 3079F DIAST BP 80-89 MM HG: CPT | Performed by: NURSE PRACTITIONER

## 2025-03-24 PROCEDURE — 1036F TOBACCO NON-USER: CPT | Performed by: NURSE PRACTITIONER

## 2025-03-24 PROCEDURE — 99214 OFFICE O/P EST MOD 30 MIN: CPT | Performed by: NURSE PRACTITIONER

## 2025-03-24 ASSESSMENT — PATIENT HEALTH QUESTIONNAIRE - PHQ9
SUM OF ALL RESPONSES TO PHQ9 QUESTIONS 1 AND 2: 0
1. LITTLE INTEREST OR PLEASURE IN DOING THINGS: NOT AT ALL
2. FEELING DOWN, DEPRESSED OR HOPELESS: NOT AT ALL

## 2025-03-24 NOTE — LETTER
March 24, 2025      To whom it may concern:     Mr. Eric Tinajero is currently under my medical care. Please excuse him from work. Tentative return to work is 3/27/2025.     Sincerely,     IVANA Oliveros

## 2025-03-24 NOTE — PROGRESS NOTES
Subjective   Patient ID: Eric Tinajero is a 65 y.o. male who presents for flu-like symptoms.     HPI   Patient in office with complaints of malaise, headache, fever (subsided), nausea, vomiting, and diarrhea x 1 day. No other symptoms or concerns today.     Review of Systems  Constitutional:  Negative for activity change, appetite change, chills, diaphoresis, fatigue, and unexpected weight change.        Positive for malaise  HENT:  Negative for ear discharge, ear pain, sore throat, congestion, facial swelling, mouth sores, nosebleeds, sneezing, tinnitus, trouble swallowing and voice change.    Eyes:  Negative for photophobia, pain, discharge, redness, itching and visual disturbance.   Respiratory:  Negative for cough, apnea, choking, chest tightness, shortness of breath, wheezing and stridor.    Cardiovascular:  Negative for chest pain, palpitations and leg swelling.   Gastrointestinal: Positive for nausea, vomiting, and diarrhea. Negative for abdominal pain, diarrhea, nausea and vomiting.   Neurological: Positive for headache. Negative for dizziness, syncope, weakness and headaches.   Hematological:  Negative for adenopathy. Does not bruise/bleed easily.      Objective   /89   Pulse 80   Temp 36.4 °C (97.5 °F) (Temporal)   Resp 16   Wt 129 kg (284 lb 9.6 oz)   SpO2 98%   BMI 36.05 kg/m²     Physical Exam  Abdominal:      General: Bowel sounds are normal. There is no distension.      Palpations: Abdomen is soft. There is no mass.      Tenderness: There is no abdominal tenderness. There is no right CVA tenderness, left CVA tenderness, guarding or rebound.      Hernia: No hernia is present.     Constitutional:       Appearance: Normal appearance.   HENT:      Head: Normocephalic.      Right Ear: Tympanic membrane, ear canal and external ear normal.      Left Ear: Tympanic membrane, ear canal and external ear normal.      Nose: Congestion present.      Mouth/Throat:      Mouth: Mucous membranes are moist.       Pharynx: Oropharynx is clear.  No oropharyngeal redness or exudate.   Eyes:      Conjunctiva/sclera: Conjunctivae normal.   Cardiovascular:      Rate and Rhythm: Normal rate and regular rhythm.      Pulses: Normal pulses.      Heart sounds: Normal heart sounds.   Pulmonary:      Effort: Pulmonary effort is normal.      Breath sounds: Normal breath sounds.   Musculoskeletal:      Cervical back: Neck supple. No rigidity or tenderness.   Lymphadenopathy:      Cervical: No cervical adenopathy.   Skin:     Coloration: Skin is not jaundiced or pale.   Neurological:      General: No focal deficit present.      Mental Status: She is alert.      Assessment/Plan     Exam findings reviewed with patient. Medications discussed with patient. The patient may use Tylenol for fever and pain control, Imodium for diarrhea. Advocated rest and proper hydration and BRAT (banana, rice, applesauce, toast) diet x 2-3 days. Standard COVID, Flu, and RSV precautions discussed. Emergent signs and symptoms reviewed: uncontrolled fever, shortness of breath or chest pain, SaO2 level <95% on room air, uncontrolled vomiting or diarrhea. The patient verbalized understanding. The patient knows where to get emergent help. We will call with lab results and update the patient on the plan of care. Follow up in 1 week or earlier if no improvement.

## 2025-03-25 LAB
FLUAV RNA SPEC QL NAA+PROBE: NOT DETECTED
FLUBV RNA SPEC QL NAA+PROBE: NOT DETECTED
RSV RNA SPEC QL NAA+PROBE: NOT DETECTED
SARS-COV-2 RNA RESP QL NAA+PROBE: NOT DETECTED

## 2025-05-26 ENCOUNTER — APPOINTMENT (OUTPATIENT)
Dept: HEMATOLOGY/ONCOLOGY | Facility: HOSPITAL | Age: 65
End: 2025-05-26
Payer: COMMERCIAL

## 2025-06-02 ENCOUNTER — APPOINTMENT (OUTPATIENT)
Dept: HEMATOLOGY/ONCOLOGY | Facility: HOSPITAL | Age: 65
End: 2025-06-02
Payer: COMMERCIAL

## 2025-06-16 ENCOUNTER — APPOINTMENT (OUTPATIENT)
Dept: LAB | Facility: HOSPITAL | Age: 65
End: 2025-06-16
Payer: COMMERCIAL

## 2025-06-16 ENCOUNTER — OFFICE VISIT (OUTPATIENT)
Dept: HEMATOLOGY/ONCOLOGY | Facility: HOSPITAL | Age: 65
End: 2025-06-16
Payer: COMMERCIAL

## 2025-06-16 ENCOUNTER — LAB (OUTPATIENT)
Dept: LAB | Facility: HOSPITAL | Age: 65
End: 2025-06-16
Payer: COMMERCIAL

## 2025-06-16 VITALS
SYSTOLIC BLOOD PRESSURE: 158 MMHG | DIASTOLIC BLOOD PRESSURE: 79 MMHG | TEMPERATURE: 95.4 F | RESPIRATION RATE: 18 BRPM | OXYGEN SATURATION: 99 % | BODY MASS INDEX: 36.25 KG/M2 | HEART RATE: 80 BPM | WEIGHT: 286.2 LBS

## 2025-06-16 DIAGNOSIS — C93.10 CHRONIC MYELOMONOCYTIC LEUKEMIA NOT HAVING ACHIEVED REMISSION (MULTI): ICD-10-CM

## 2025-06-16 LAB
ALBUMIN SERPL BCP-MCNC: 4.6 G/DL (ref 3.4–5)
ALP SERPL-CCNC: 45 U/L (ref 33–136)
ALT SERPL W P-5'-P-CCNC: 84 U/L (ref 10–52)
ANION GAP SERPL CALC-SCNC: 14 MMOL/L (ref 10–20)
AST SERPL W P-5'-P-CCNC: 49 U/L (ref 9–39)
BASOPHILS # BLD AUTO: 0.05 X10*3/UL (ref 0–0.1)
BASOPHILS NFR BLD AUTO: 0.8 %
BILIRUB SERPL-MCNC: 0.9 MG/DL (ref 0–1.2)
BUN SERPL-MCNC: 11 MG/DL (ref 6–23)
CALCIUM SERPL-MCNC: 10.3 MG/DL (ref 8.6–10.3)
CHLORIDE SERPL-SCNC: 101 MMOL/L (ref 98–107)
CO2 SERPL-SCNC: 29 MMOL/L (ref 21–32)
CREAT SERPL-MCNC: 0.74 MG/DL (ref 0.5–1.3)
EGFRCR SERPLBLD CKD-EPI 2021: >90 ML/MIN/1.73M*2
EOSINOPHIL # BLD AUTO: 0.08 X10*3/UL (ref 0–0.7)
EOSINOPHIL NFR BLD AUTO: 1.3 %
ERYTHROCYTE [DISTWIDTH] IN BLOOD BY AUTOMATED COUNT: 11.9 % (ref 11.5–14.5)
GLUCOSE SERPL-MCNC: 271 MG/DL (ref 74–99)
HCT VFR BLD AUTO: 48.2 % (ref 41–52)
HGB BLD-MCNC: 16.8 G/DL (ref 13.5–17.5)
IMM GRANULOCYTES # BLD AUTO: 0.02 X10*3/UL (ref 0–0.7)
IMM GRANULOCYTES NFR BLD AUTO: 0.3 % (ref 0–0.9)
LDH SERPL L TO P-CCNC: 148 U/L (ref 84–246)
LYMPHOCYTES # BLD AUTO: 1.61 X10*3/UL (ref 1.2–4.8)
LYMPHOCYTES NFR BLD AUTO: 26.4 %
MCH RBC QN AUTO: 31.4 PG (ref 26–34)
MCHC RBC AUTO-ENTMCNC: 34.9 G/DL (ref 32–36)
MCV RBC AUTO: 90 FL (ref 80–100)
MONOCYTES # BLD AUTO: 1.28 X10*3/UL (ref 0.1–1)
MONOCYTES NFR BLD AUTO: 21 %
NEUTROPHILS # BLD AUTO: 3.06 X10*3/UL (ref 1.2–7.7)
NEUTROPHILS NFR BLD AUTO: 50.2 %
NRBC BLD-RTO: 0 /100 WBCS (ref 0–0)
PLATELET # BLD AUTO: 146 X10*3/UL (ref 150–450)
POTASSIUM SERPL-SCNC: 4.5 MMOL/L (ref 3.5–5.3)
PROT SERPL-MCNC: 7.4 G/DL (ref 6.4–8.2)
RBC # BLD AUTO: 5.35 X10*6/UL (ref 4.5–5.9)
SODIUM SERPL-SCNC: 139 MMOL/L (ref 136–145)
WBC # BLD AUTO: 6.1 X10*3/UL (ref 4.4–11.3)

## 2025-06-16 PROCEDURE — 4010F ACE/ARB THERAPY RXD/TAKEN: CPT | Performed by: INTERNAL MEDICINE

## 2025-06-16 PROCEDURE — 3077F SYST BP >= 140 MM HG: CPT | Performed by: INTERNAL MEDICINE

## 2025-06-16 PROCEDURE — 80053 COMPREHEN METABOLIC PANEL: CPT

## 2025-06-16 PROCEDURE — 83615 LACTATE (LD) (LDH) ENZYME: CPT

## 2025-06-16 PROCEDURE — 85025 COMPLETE CBC W/AUTO DIFF WBC: CPT

## 2025-06-16 PROCEDURE — 36415 COLL VENOUS BLD VENIPUNCTURE: CPT

## 2025-06-16 PROCEDURE — 99213 OFFICE O/P EST LOW 20 MIN: CPT | Performed by: INTERNAL MEDICINE

## 2025-06-16 PROCEDURE — 3078F DIAST BP <80 MM HG: CPT | Performed by: INTERNAL MEDICINE

## 2025-06-16 ASSESSMENT — PAIN SCALES - GENERAL: PAINLEVEL_OUTOF10: 0-NO PAIN

## 2025-06-16 NOTE — ASSESSMENT & PLAN NOTE
6/16/2025: Since the patient's last visit in the spring, his hematologic parameters continue to remain quite stable.  I reviewed that his monocytes have been higher in the past but have also been lower continue to move around at different intervals, but perhaps more critically his hemoglobin his platelets and his neutrophils are all quite stable.  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

## 2025-06-16 NOTE — PROGRESS NOTES
Patient ID: Eric Tinajero is a 65 y.o. male.  Referring Physician: Mundo Garcia MD  [address] [place], [address] [place] [id number]  Primary Care Provider: SAHIL Wiggins-CNP    Date of Service:  6/16/2025    Oncology History   CMML (chronic myelomonocytic leukemia) (Multi)   5/20/2024 Initial Diagnosis    Clonal Monocytosis of Undetermined Significance (ICC) vs CMML (chronic myelomonocytic leukemia)-1 (WHO)     Diagnosis:   Patient was referred to hematology on 1/12/2024 in the setting of an abnormal CBC.  Initial evaluation noted monocytosis since 2/5/2018, ranging 1.08-1.83 and workup identified monocytic atypia.  Was arranged for bone marrow biopsy on 4/24/2024, which demonstrated: -- NORMOCELLULAR BONE MARROW (40%) WITH MILD MONOCYTOSIS AND NORMAL TRILINEAGE HEMATOPOIESIS, SEE NOTE.   NOTE: There is a peripheral monocytosis which appears persistent based on review of the medical record. Morphologically, no definite dysplasia is noted and monocytes appear mostly normal. Blasts are not increased. By flow cytometry, a reduction in non-classical monocytes is noted without other abnormalities. Recent myeloid NGS from the blood demonstrated two TET2 alterations at variant allele fraction (VAF) of 22%. 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 Of note WHO 2022 classification does not include CMML-0 which the process would have been previously been categorized. Moreover, by ICC 2022 classification, the process is based considered a clonal monocytosis of undetermined significance since there is no overt evidence of dyspoiesis or increase in blasts in the marrow or blood.  Additional genetic studies are pending. Clinical correlation recommended.  Molecular: TET2 mutations x2 (max VAF 22%)  Karyotype:  4/17 PB FISH for BCR-ABL negative. FISH negative for deletion 5q/monosomy 5, deletion 7q/monosomy 7, trisomy 8, deletion 17p/monosomy 17, and deletion 20q.  Break apart FISH negative for FGFR1 rearrangement and KMT2A rearrangements.  Pending PDGFR FISH  Risk stratification (assumes normal karyotype will be confirmed)  CPSS-Mol - 0 - low risk  GFM - low risk  Vasquez Molecular Model - low risk    Treatment:   None to date - will be recommended observation          No major health events.  He proffered, that he's having urinary urgency/sometimes having trouble making it to the bathroom on time.  He was worried it might be induced by drinking a lot of fluid prior to phlebotomy.  He also wondered about a connection between this and neuropathy.  He is noting that his feet seem to be getting increasingly numb.  For example, he spread six hours of topsoil the other day.  He felt with his feet and diabetes symptoms, he had to be careful with is feet, and had a hematoma/blister, on his toe, he drained it on his own - but used this parable to explain that he is watching his feet well.  Just seeing his primary       Assessment & Plan  Chronic myelomonocytic leukemia not having achieved remission (Multi)  6/16/2025: Since the patient's last visit in the spring, his hematologic parameters continue to remain quite stable.  I reviewed that his monocytes have been higher in the past but have also been lower continue to move around at different intervals, but perhaps more critically his hemoglobin his platelets and his neutrophils are all quite stable.  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   HENT:  Negative for congestion, mouth sores, nosebleeds, rhinorrhea, sinus pressure, sinus pain and sore throat.    Eyes:  Negative for visual disturbance.   Respiratory:  Negative for cough and shortness of breath.    Cardiovascular:  Negative for chest pain and leg swelling.   Gastrointestinal:  Negative for abdominal pain, constipation, diarrhea, nausea and vomiting.   Genitourinary:  Negative for difficulty urinating and flank pain.   Musculoskeletal:  Negative for back pain and joint swelling.   Skin:  Negative for rash.   Neurological:  Negative for weakness, light-headedness, numbness and headaches.   Hematological:  Negative for adenopathy. Does not bruise/bleed easily.   Psychiatric/Behavioral:  Negative for confusion and dysphoric mood. The patient is not nervous/anxious.        Home Medications and Adherence Reviewed with Patient.       Objective      VS:  /79 (BP Location: Right arm, Patient Position: Sitting, BP Cuff Size: Adult)   Pulse 80   Temp 35.2 °C (95.4 °F) (Temporal)   Resp 18   Wt 130 kg (286 lb 3.2 oz)   SpO2 99%   BMI 36.25 kg/m²   BSA: 2.61 meters squared  KPS: 100    Physical Exam  Constitutional:       Appearance: Normal appearance.   HENT:      Mouth/Throat:      Mouth: Mucous membranes are moist.   Eyes:      Conjunctiva/sclera: Conjunctivae normal.      Pupils: Pupils are equal, round, and reactive to light.   Abdominal:      General: Abdomen is flat.      Palpations: Abdomen is soft. There is no splenomegaly.   Musculoskeletal:         General: Normal range of motion.   Lymphadenopathy:      Head:      Right side of head: No submental, submandibular, tonsillar, preauricular, posterior  auricular or occipital adenopathy.      Left side of head: No submental, submandibular, tonsillar, preauricular, posterior auricular or occipital adenopathy.      Cervical: No cervical adenopathy.      Right cervical: No superficial cervical adenopathy.     Left cervical: No superficial cervical adenopathy.   Skin:     General: Skin is warm and dry.   Neurological:      General: No focal deficit present.      Mental Status: He is oriented to person, place, and time.   Psychiatric:         Mood and Affect: Mood normal.         Behavior: Behavior normal.         Laboratory:  Pertinent laboratory results were reviewed and discussed with patient, notably:   White blood cell 6.1, hemoglobin 16.8, platelets 146  Mild monocytosis at 1.28            Mundo Garcia MD

## 2025-06-18 ASSESSMENT — ENCOUNTER SYMPTOMS
APPETITE CHANGE: 0
SORE THROAT: 0
FLANK PAIN: 0
COUGH: 0
BRUISES/BLEEDS EASILY: 0
NAUSEA: 0
SINUS PAIN: 0
BACK PAIN: 0
NERVOUS/ANXIOUS: 0
SHORTNESS OF BREATH: 0
DIFFICULTY URINATING: 0
JOINT SWELLING: 0
ABDOMINAL PAIN: 0
WEAKNESS: 0
SINUS PRESSURE: 0
DIARRHEA: 0
CHILLS: 0
DIAPHORESIS: 0
NUMBNESS: 0
ADENOPATHY: 0
HEADACHES: 0
DYSPHORIC MOOD: 0
CONSTIPATION: 0
FATIGUE: 0
CONFUSION: 0
LIGHT-HEADEDNESS: 0
FEVER: 0
UNEXPECTED WEIGHT CHANGE: 0
VOMITING: 0
ACTIVITY CHANGE: 0
RHINORRHEA: 0

## 2025-09-03 ENCOUNTER — PATIENT MESSAGE (OUTPATIENT)
Dept: PRIMARY CARE | Facility: CLINIC | Age: 65
End: 2025-09-03
Payer: COMMERCIAL

## 2025-09-03 DIAGNOSIS — E11.9 TYPE 2 DIABETES MELLITUS WITHOUT COMPLICATION, WITHOUT LONG-TERM CURRENT USE OF INSULIN: ICD-10-CM

## 2025-09-03 RX ORDER — METFORMIN HYDROCHLORIDE 500 MG/1
500 TABLET ORAL
Qty: 180 TABLET | Refills: 1 | OUTPATIENT
Start: 2025-09-03

## 2025-09-04 ENCOUNTER — TELEMEDICINE (OUTPATIENT)
Dept: PRIMARY CARE | Facility: CLINIC | Age: 65
End: 2025-09-04
Payer: COMMERCIAL

## 2025-09-04 DIAGNOSIS — E11.9 TYPE 2 DIABETES MELLITUS WITHOUT COMPLICATION, WITHOUT LONG-TERM CURRENT USE OF INSULIN: Primary | ICD-10-CM

## 2025-09-04 DIAGNOSIS — Z91.89 OTHER SPECIFIED PERSONAL RISK FACTORS, NOT ELSEWHERE CLASSIFIED: ICD-10-CM

## 2025-09-04 DIAGNOSIS — I10 ESSENTIAL (PRIMARY) HYPERTENSION: ICD-10-CM

## 2025-09-04 PROCEDURE — 4010F ACE/ARB THERAPY RXD/TAKEN: CPT | Performed by: NURSE PRACTITIONER

## 2025-09-04 PROCEDURE — 1036F TOBACCO NON-USER: CPT | Performed by: NURSE PRACTITIONER

## 2025-09-04 PROCEDURE — 99214 OFFICE O/P EST MOD 30 MIN: CPT | Performed by: NURSE PRACTITIONER

## 2025-09-04 RX ORDER — ATORVASTATIN CALCIUM 20 MG/1
20 TABLET, FILM COATED ORAL DAILY
Qty: 90 TABLET | Refills: 1 | Status: SHIPPED | OUTPATIENT
Start: 2025-09-04

## 2025-09-04 RX ORDER — LISINOPRIL 20 MG/1
20 TABLET ORAL DAILY
Qty: 90 TABLET | Refills: 1 | Status: SHIPPED | OUTPATIENT
Start: 2025-09-04

## 2025-09-04 RX ORDER — METFORMIN HYDROCHLORIDE 500 MG/1
500 TABLET ORAL
Qty: 180 TABLET | Refills: 1 | Status: SHIPPED | OUTPATIENT
Start: 2025-09-04 | End: 2025-09-07 | Stop reason: DRUGHIGH

## 2025-09-04 ASSESSMENT — ENCOUNTER SYMPTOMS
SPEECH DIFFICULTY: 0
WEIGHT LOSS: 0
ABDOMINAL PAIN: 0
SEIZURES: 0
CHILLS: 0
STRIDOR: 0
TREMORS: 0
SWEATS: 0
POLYDIPSIA: 0
DIARRHEA: 0
ARTHRALGIAS: 0
MYALGIAS: 0
VISUAL CHANGE: 0
SHORTNESS OF BREATH: 0
VOMITING: 0
NERVOUS/ANXIOUS: 0
COUGH: 0
BLACKOUTS: 0
HUNGER: 0
DIZZINESS: 0
BLURRED VISION: 0
DIAPHORESIS: 0
PALPITATIONS: 0
CONFUSION: 0
ACTIVITY CHANGE: 0
NAUSEA: 0
POLYPHAGIA: 0
CHOKING: 0
APNEA: 0
FATIGUE: 0
WEAKNESS: 0
CHEST TIGHTNESS: 0
WHEEZING: 0
HEADACHES: 0

## 2025-09-06 LAB
ANION GAP SERPL CALCULATED.4IONS-SCNC: 10 MMOL/L (CALC) (ref 7–17)
BUN SERPL-MCNC: 12 MG/DL (ref 7–25)
BUN/CREAT SERPL: ABNORMAL (CALC) (ref 6–22)
CALCIUM SERPL-MCNC: 9.4 MG/DL (ref 8.6–10.3)
CHLORIDE SERPL-SCNC: 102 MMOL/L (ref 98–110)
CO2 SERPL-SCNC: 28 MMOL/L (ref 20–32)
CREAT SERPL-MCNC: 0.8 MG/DL (ref 0.7–1.35)
EGFRCR SERPLBLD CKD-EPI 2021: 99 ML/MIN/1.73M2
EST. AVERAGE GLUCOSE BLD GHB EST-MCNC: 209 MG/DL
EST. AVERAGE GLUCOSE BLD GHB EST-SCNC: 11.6 MMOL/L
GLUCOSE SERPL-MCNC: 181 MG/DL (ref 65–99)
HBA1C MFR BLD: 8.9 %
POTASSIUM SERPL-SCNC: 4.7 MMOL/L (ref 3.5–5.3)
SODIUM SERPL-SCNC: 140 MMOL/L (ref 135–146)

## 2025-09-07 DIAGNOSIS — E11.9 TYPE 2 DIABETES MELLITUS WITHOUT COMPLICATION, WITHOUT LONG-TERM CURRENT USE OF INSULIN: ICD-10-CM

## 2025-09-07 RX ORDER — METFORMIN HYDROCHLORIDE 500 MG/1
1000 TABLET ORAL
Qty: 120 TABLET | Refills: 1 | Status: SHIPPED | OUTPATIENT
Start: 2025-09-07 | End: 2026-09-07